# Patient Record
Sex: MALE | Race: WHITE | Employment: OTHER | URBAN - METROPOLITAN AREA
[De-identification: names, ages, dates, MRNs, and addresses within clinical notes are randomized per-mention and may not be internally consistent; named-entity substitution may affect disease eponyms.]

---

## 2017-01-04 ENCOUNTER — HOSPITAL ENCOUNTER (OUTPATIENT)
Dept: PHYSICAL THERAPY | Age: 62
Discharge: HOME | End: 2017-01-04
Payer: COMMERCIAL

## 2017-01-04 PROCEDURE — 97140 MANUAL THERAPY 1/> REGIONS: CPT

## 2017-01-04 PROCEDURE — 97110 THERAPEUTIC EXERCISES: CPT

## 2017-01-04 PROCEDURE — 97032 APPL MODALITY 1+ESTIM EA 15: CPT

## 2017-01-04 NOTE — PROGRESS NOTES
PHYSICAL THERAPY - DAILY TREATMENT NOTE    Patient Name: Brock Perez        Date: 2017  : 1955   YES Patient  Verified  Visit #:     Insurance: Payor: [employer] [place] / Plan: [place] MEDICARE PART A & B / Product Type: Medicare /      In time: 1:30 Out time: 2:30   Total Treatment Time: 60     TREATMENT AREA = Neck pain [M54.2]  Right shoulder pain [M25.511]    SUBJECTIVE    Pain Level (on 0 to 10 scale):  6-7  / 10   Medication Changes/New allergies or changes in medical history, any new surgeries or procedures? NO    If yes, update Summary List   Subjective Functional Status/Changes:  []  No changes reported     \"It was sore for a while, but not bad. Saw Terrence Rausch yesterday. Jessa Romero going to give me another injection on Tuesday next week. I told him it seems like Dr. Andrei Pina wants to schedule surgery (TSA), so he wants to make sure my neck is ok before the surgery b/c he knows that'll mess it up. OBJECTIVE    25 min Therapeutic Exercise:  [x]  See flow sheets   Rationale:      increase ROM, increase strength/stability, facilitate proper motor control. 15 min Manual Therapy: DN per below   Rationale:      decrease pain, increase ROM, increase tissue extensibility, decrease trigger points and facilitate proper motor control     Modalities Rationale: Prophylaxis/Palliative   20 min [] Estim, type/location: Electrical DN: 2-4 [email or web address] gauge solid filament needle for semi-standardized protocol for C/S radiculopathy. Re-wind and flick every 5'.  12 needles in situ                                    [x]  att     []  unatt     []  w/US     []  w/ice    []  W/heat    []  before manual    [x]  after manual    min []  Mechanical Traction: type/lbs                   []  pro   []  sup   []  int   []  cont    []  before manual    []  after manual    min []  Ultrasound, settings/location:      min []  Iontophoresis w/ dexamethasone, location: []  take home patch       []  in clinic    min []  Ice     []  Heat    Position/location:     min []  Vasopneumatic Device, press/temp:    [] Skin assessment post-treatment (if applicable):    []  intact    []  redness- no adverse reaction     []redness  adverse reaction:      Throughout Rx min Patient Education:  YES   Reviewed HEP   []  Progressed/Changed HEP based on:   Display of proper form in clinic. Improvement in condition and complaints     Other Objective/Functional Measures:    Dry Needling Procedure Note    Procedure: An intramuscular manual therapy (dry needling) and a neuro-muscular re-education treatment was done to deactivate myofascial trigger points with a 30 gauge filament needle under aseptic technique. Indications:  [x] Myofascial pain and dysfunction [] Muscled spasms  [] Myalgia/myositis   [] Muscle cramps  [] Muscle imbalances  [] Temporomandibular Dysfunction  [] Other:    Chart reviewed for the following:  Gal MITCHELL (certified treating therapist), have reviewed the medical history, summary list and precautions/contraindications for [facility] III.   TIME OUT performed immediately prior to start of procedure:  Gal MITCHELL (certified treating therapist), have performed the following reviews on [facility] III prior to the start of the session:      [x] Verified patient identification by name and date of birth    [x] Agreement on all muscles being treated was verified   [x] Purpose of dry needling, side effects, possible complications, risks and benefits were explained to the patient   [x] Procedure site(s) verified  [x] Patient was positioned for comfort and draped for privacy  [x] Informed Consent was signed (initial visit) and verified verbally (subsequent visits)  [x] Patient was instructed on the need to report the use of blood thinners and/or immunosuppressant medications  [x] How to respond to possible adverse effects of treatment  [x] Self treatment of post needling soreness: ice, heat (moist heat, heat wraps) and stretching  [x] Opportunity was given to ask any questions, all questions were answered            Time: 1:55  Date of procedure: 1/4/2017  Treatment: The following muscles were treated today with intramuscular dry needling  [] Left [] Right Masster  [] Left [] Right Temporalis  [] Left [] Right Zygomaticus Major / Minor  [] Left [] Right Lateral Pterygoid  [] Left [] Right Medial Pterygoid  [] Left [] Right Digastric Post / Anterior Belly  [] Left [] Right Sternocleidomastoid  [] Left [] Right Scalene Anterior / Medial / Posterior  [] Left [] Right Extra Laryngeal Muscles  [] Left [] Right Upper Trapezius  [] Left [] Right Middle Trapezius  [] Left [] Right Lower Trapezius  [] Left [] Right Oblique Capitis Inferior  [x] Left [x] Right Splenius Capitis / Cervicis  [x] Left [x] Right Semispinalis: Capitis / Cervicis  [x] Left [x] Right Multifidi / Rotatores Cervicis / Thoracic  [x] Left [x] Right Longissimus Thoracis / Illiocostalis  [] Left [] Right Levator Scapulae  [] Left [] Right Supraspinatus / Infraspinatus  [] Left [] Right Teres Major / Minor  [] Left [] Right Rhomboids / Serratus posterior superior  [] Left [] Right Pectoralis Major / Minor  [] Left [] Right Serratus Anterior  [] Left [] Right Latissimus Dorsi  [] Left [] Right Subscapularis  [] Left [] Right Coracobrachialis  [] Left [] Right Biceps Brachii  [] Left [] Right Deltoid: Anterior / Medial / Posterior  [] Left [] Right Brachialis  [] Left [] Right Triceps  [] Left [] Right Brachioradialis  [] Left [] Right Extensor Carpi Radialis Brevis / Extensor Carpi Radialis Longus    [] Left [] Right  Extensor digitorum  [] Left [] Right Supinator / Pronator Teres  [] Left [] Right Flexor Carpi Radialis/ Flexor Carpi Ulnaris   [] Left [] Right  Flexor Digitorum Superficialis/ Flexor Digitorum Profundus  [] Left [] Right Flexor Pollicis Longus / Flexor Pollicis Brevis / Palmaris Longus  [] Left [] Right Abductor Pollicis Longus / Abductor Pollicis Brevis  [] Left [] Right Opponens Pollicis / Adductor Pollicis  [] Left [] Right Dorsal / Palmar Interossei / Lumbricalis  [] Left [] Right Abductor Digiti Minimi / Opponens Digiti Minimi    Patient's response to today's treatment:  [] Latent Twitch Response     [x] Muscle relaxation [x] Pain Relief  [] Post needling soreness    [x] without complications  [] Increased Range of Motion   [] Decreased headaches    [] Decreased Tinnitus  [] Other:     Performed by: Xavier Castillo \"BJ\" QUENTIN Vargas, Cert. MDT, CertFlorentino DN         Post Treatment Pain Level (on 0 to 10) scale:   0  / 10     ASSESSMENT    Assessment/Changes in Function:     Responding well in pain relief of symptoms      []  See Progress Note/Recertification   Patient will continue to benefit from skilled PT services to modify and progress therapeutic interventions, address functional mobility deficits, address ROM deficits, address strength deficits, analyze and address soft tissue restrictions, analyze and cue movement patterns, analyze and modify body mechanics/ergonomics and instruct in home and community integration  to attain remaining goals   Progress toward goals / Updated goals:    Progress towards DC due to probable sx     PLAN    [x]  Upgrade activities as tolerated YES Continue plan of care   []  Discharge due to :    []  Other:      Therapist: Xavier Castillo \"BJ\" Carlos Dennis DPT, Chana LOTTT, CertFlorentino  DN    Date: 1/4/2017 Time: 1:49 PM   Future Appointments  Date Time Provider Corinne Morgan   1/11/2017 1:30 PM Joy Jay PT [facility]   1/18/2017 1:30 PM Joy Jay PT [facility]   1/25/2017 1:30 PM Joy Jay PT [facility]   10/27/2017 9:15 AM [facility] SAIMA RIVAS4 NURSE [facility] RADHA ESQUIVEL   11/3/2017 9:00 AM Al Sotelo MD [address] [facility]

## 2017-01-11 ENCOUNTER — APPOINTMENT (OUTPATIENT)
Dept: PHYSICAL THERAPY | Age: 62
End: 2017-01-11
Payer: COMMERCIAL

## 2017-01-18 ENCOUNTER — HOSPITAL ENCOUNTER (OUTPATIENT)
Dept: PHYSICAL THERAPY | Age: 62
Discharge: HOME | End: 2017-01-18
Payer: COMMERCIAL

## 2017-01-18 PROCEDURE — 97032 APPL MODALITY 1+ESTIM EA 15: CPT

## 2017-01-18 PROCEDURE — 97140 MANUAL THERAPY 1/> REGIONS: CPT

## 2017-01-18 NOTE — PROGRESS NOTES
PHYSICAL THERAPY - DAILY TREATMENT NOTE    Patient Name: Prince Kidd        Date: 2017  : 1955   YES Patient  Verified  Visit #:   7     Insurance: Payor: [employer] / Plan: Abraham Kennedy / Product Type: [employer] /      In time: 1:30 Out time: 2:10   Total Treatment Time: 40     TREATMENT AREA = Neck pain [M54.2]  Right shoulder pain [M25.511]    SUBJECTIVE    Pain Level (on 0 to 10 scale):  6-7  / 10   Medication Changes/New allergies or changes in medical history, any new surgeries or procedures? NO    If yes, update Summary List   Subjective Functional Status/Changes:  []  No changes reported     \"Dr. Maricarmen Espitia said to focus more of the treatment on the neck. They gave me another shot and it didn't do anything. They've recommended me to see Dr. Jania Kay. \"          OBJECTIVE    23 min Manual Therapy: DN per below   Rationale:      decrease pain, increase ROM, increase tissue extensibility, decrease trigger points and facilitate proper motor control   Modalities Rationale: Prophylaxis/Palliative   17 min [] Estim, type/location: Electrical DN: 2-4 [email or web address] gauge solid filament needle for semi-standardized protocol for C/S pain and radiculopathy. Re-wind and flick every 5'.  18 needles total in situ                                    [x]  att     []  unatt     []  w/US     []  w/ice    []  W/heat    []  before manual    []  after manual    min []  Mechanical Traction: type/lbs                   []  pro   []  sup   []  int   []  cont    []  before manual    []  after manual    min []  Ultrasound, settings/location:      min []  Iontophoresis w/ dexamethasone, location:                                               []  take home patch       []  in clinic    min []  Ice     []  Heat    Position/location:     min []  Vasopneumatic Device, press/temp:    [] Skin assessment post-treatment (if applicable):    []  intact    []  redness- no adverse reaction     []redness  adverse reaction:      Throughout Rx min Patient Education:  YES   Reviewed HEP   []  Progressed/Changed HEP based on:   Display of proper form in clinic. Improvement in condition and complaints     Other Objective/Functional Measures:    Dry Needling Procedure Note    Procedure: An intramuscular manual therapy (dry needling) and a neuro-muscular re-education treatment was done to deactivate myofascial trigger points with a 30 gauge filament needle under aseptic technique. Indications:  [] Myofascial pain and dysfunction [] Muscled spasms  [] Myalgia/myositis   [] Muscle cramps  [] Muscle imbalances  [] Temporomandibular Dysfunction  [] Other:    Chart reviewed for the following:  Alo MITCHELL (certified treating therapist), have reviewed the medical history, summary list and precautions/contraindications for [facility].   TIME OUT performed immediately prior to start of procedure:  Alo MITCHELL (certified treating therapist), have performed the following reviews on [facility] prior to the start of the session:      [x] Verified patient identification by name and date of birth    [x] Agreement on all muscles being treated was verified   [x] Purpose of dry needling, side effects, possible complications, risks and benefits were explained to the patient   [x] Procedure site(s) verified  [x] Patient was positioned for comfort and draped for privacy  [x] Informed Consent was signed (initial visit) and verified verbally (subsequent visits)  [x] Patient was instructed on the need to report the use of blood thinners and/or immunosuppressant medications  [x] How to respond to possible adverse effects of treatment  [x] Self treatment of post needling soreness: ice, heat (moist heat, heat wraps) and stretching  [x] Opportunity was given to ask any questions, all questions were answered            Time: 1:30  Date of procedure: 1/18/2017  Treatment: The following muscles were treated today with intramuscular dry needling  [] Left [] Right Masster  [] Left [] Right Temporalis  [] Left [] Right Zygomaticus Major / Minor  [] Left [] Right Lateral Pterygoid  [] Left [] Right Medial Pterygoid  [] Left [] Right Digastric Post / Anterior Belly  [] Left [] Right Sternocleidomastoid  [] Left [] Right Scalene Anterior / Medial / Posterior  [] Left [] Right Extra Laryngeal Muscles  [] Left [] Right Upper Trapezius  [] Left [] Right Middle Trapezius  [] Left [] Right Lower Trapezius  [x] Left [x] Right Oblique Capitis Inferior  [x] Left [x] Right Splenius Capitis / Cervicis  [x] Left [x] Right Semispinalis: Capitis / Cervicis  [] Left [] Right Multifidi / Rotatores Cervicis / Thoracic  [] Left [] Right Longissimus Thoracis / Illiocostalis  [] Left [] Right Levator Scapulae  [] Left [] Right Supraspinatus / Infraspinatus  [] Left [] Right Teres Major / Minor  [] Left [] Right Rhomboids / Serratus posterior superior  [] Left [] Right Pectoralis Major / Minor  [] Left [] Right Serratus Anterior  [] Left [] Right Latissimus Dorsi  [] Left [] Right Subscapularis  [] Left [] Right Coracobrachialis  [] Left [] Right Biceps Brachii  [] Left [] Right Deltoid: Anterior / Medial / Posterior  [] Left [] Right Brachialis  [] Left [] Right Triceps  [] Left [] Right Brachioradialis  [] Left [] Right Extensor Carpi Radialis Brevis / Extensor Carpi Radialis Longus    [] Left [] Right  Extensor digitorum  [] Left [] Right Supinator / Pronator Teres  [] Left [] Right Flexor Carpi Radialis/ Flexor Carpi Ulnaris   [] Left [] Right  Flexor Digitorum Superficialis/ Flexor Digitorum Profundus  [] Left [] Right Flexor Pollicis Longus / Flexor Pollicis Brevis / Palmaris Longus  [] Left [] Right Abductor Pollicis Longus / Abductor Pollicis Brevis  [] Left [] Right Opponens Pollicis / Adductor Pollicis  [] Left [] Right Dorsal / Palmar Interossei / Lumbricalis  [] Left [] Right Abductor Digiti Minimi / Opponens Digiti Minimi    Patient's response to today's treatment:  [] Latent Twitch Response     [] Muscle relaxation [] Pain Relief  [] Post needling soreness    [] without complications  [] Increased Range of Motion   [] Decreased headaches    [] Decreased Tinnitus  [] Other:     Performed by: Jayson Smith DPT, Cert. JOAN, Chana DN         Post Treatment Pain Level (on 0 to 10) scale:   0  / 10     ASSESSMENT    Assessment/Changes in Function:     C/S component primary for RUE     []  See Progress Note/Recertification   Patient will continue to benefit from skilled PT services to instruct in home and community integration  to attain remaining goals   Progress toward goals / Updated goals:    Progressing to DC NV     PLAN    [x]  Upgrade activities as tolerated YES Continue plan of care   []  Discharge due to :    []  Other:      Therapist: Judith Torres \"BJ\" QUENTIN Vargas, CertFlroentino FRANCO, CertFlorentino  DN    Date: 1/18/2017 Time: 1:28 PM   Future Appointments  Date Time Provider Corinne Morgan   1/18/2017 1:30 PM Izzy Taylor PT [facility]   1/25/2017 1:30 PM Izzy Taylor PT [facility]   10/27/2017 9:15 AM [facility] POD4 NURSE [facility] RADHA ESQUIVEL   11/3/2017 9:00 AM Roland Comer MD [address] [facility]

## 2017-01-25 ENCOUNTER — APPOINTMENT (OUTPATIENT)
Dept: PHYSICAL THERAPY | Age: 62
End: 2017-01-25
Payer: COMMERCIAL

## 2017-02-06 NOTE — PROGRESS NOTES
[address]  [facility] PHYSICAL THERAPY  [address] [facility] [address], [address] - Phone: [phone]  Fax: [phone] SUMMARY  Patient Name: New Gunn : 1955   Treatment/Medical Diagnosis: Neck pain [M54.2]  Right shoulder pain [M25.511]   Referral Source: Brenda Lopez     Date of Initial Visit: 16 Attended Visits: 7 Missed Visits: 0     SUMMARY OF TREATMENT  Patient's POC has consisted of therex, therapeutic activities, manual therapy prn, modalities prn, pt. education, and a comprehensive HEP. Treatment strategies used to address functional mobility deficits, ROM deficits, strength deficits, analyze and address soft tissue restrictions, analyze and cue movement patterns, analyze and modify body mechanics/ergonomics, assess and modify postural abnormalities and instruct in home and community integration. CURRENT STATUS  Patient attended rx at a rate of 1x/week aside from missing 2 weeks due to scheduled injection. High co-pay prevented frequency >1x/week. Towards end of treatment, he received 3 dry needling sessions. Throughout the course of treatment he continued to demonstrate that his C/S appeared to be the primary cause for any RUE symptoms as he centralized and abolished with repeated C/S movements. Needling/electrical needling to that region also alleviated all UE sensations. Unfortunately, due to a high copay, patient was unable to continue with course of treatment. He reports intent for further f/u with Dr. Collins Both to determine if surgery is indicated for his C/S.    GOALS/MEASURE OF PROGRESS Goal Met? · Short Term Goals: :  1. Patient to be adherent to HEP to facilitate pain control with ADL's  2. Centralize RUE sx to level of C/S to demonstrate effectiveness of directional preference exercises and decrease risk of UE dysfunction  3.  Patient to increase C/S AROM RR to > 60 degrees to without paraesthesia to facilitate safety in scanning environment. · Long Term Goals:   1. Patient to be Independent with HEP to self-manage/prevent symptoms after DC. 2. Patient to demonstrate full cervical AROM in all planes without peripheralization to indicate return to function. 3. Patient to increase FS FOTO score to > 65 to indicate increased functional independence. MET    MET        MET        MET    MET      NOT MET       G-Codes: n/a  RECOMMENDATIONS  Other: Patient to undergo further differential dx and testing to determine of sx is appropriate. Patient given comprehensive HEP for self treatment and prophylaxis. If you have any questions/comments please contact us directly at [phone]. Thank you for allowing us to assist in the care of your patient. Therapist Signature: Dennis Sanabria \"OLI\" Dennie Mews, THEAT, Cert. MDT, Cert. DN, Cert. SMT Date: 1/18/17   Reporting Period: n/a     Certification Period n/a Time: 1:12 PM     NOTE TO PHYSICIAN:  PLEASE COMPLETE THE ORDERS BELOW AND FAX TO   [facility] Physical Therapy: [phone]  If you are unable to process this request in 24 hours please contact our office: [phone]    ___ I have read the above report and request that my patient continue as recommended.   ___ I have read the above report and request that my patient continue therapy with the following changes/special instructions:_________________________________________________________   ___ I have read the above report and request that my patient be discharged from therapy.      Physician Signature:        Date:     Time:

## 2017-02-09 ENCOUNTER — HOSPITAL ENCOUNTER (OUTPATIENT)
Dept: GENERAL RADIOLOGY | Age: 62
Discharge: HOME | End: 2017-02-09
Payer: COMMERCIAL

## 2017-02-09 DIAGNOSIS — Z01.818 PRE-OP TESTING: ICD-10-CM

## 2017-02-09 PROCEDURE — 71020 XR CHEST PA LAT: CPT

## 2017-03-31 ENCOUNTER — HOSPITAL ENCOUNTER (EMERGENCY)
Age: 62
Discharge: HOME | End: 2017-03-31
Attending: EMERGENCY MEDICINE | Admitting: EMERGENCY MEDICINE
Payer: MEDICARE

## 2017-03-31 ENCOUNTER — APPOINTMENT (OUTPATIENT)
Dept: GENERAL RADIOLOGY | Age: 62
End: 2017-03-31
Attending: EMERGENCY MEDICINE
Payer: MEDICARE

## 2017-03-31 VITALS
HEIGHT: 68 IN | DIASTOLIC BLOOD PRESSURE: 85 MMHG | TEMPERATURE: 97.5 F | RESPIRATION RATE: 18 BRPM | BODY MASS INDEX: 22.73 KG/M2 | HEART RATE: 55 BPM | OXYGEN SATURATION: 97 % | WEIGHT: 150 LBS | SYSTOLIC BLOOD PRESSURE: 127 MMHG

## 2017-03-31 DIAGNOSIS — M54.50 ACUTE LOW BACK PAIN WITHOUT SCIATICA, UNSPECIFIED BACK PAIN LATERALITY: ICD-10-CM
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
## 2017-03-31 DIAGNOSIS — M54.2 CERVICAL SPINE PAIN: ICD-10-CM

## 2017-03-31 PROCEDURE — 72040 X-RAY EXAM NECK SPINE 2-3 VW: CPT

## 2017-03-31 PROCEDURE — 99283 EMERGENCY DEPT VISIT LOW MDM: CPT

## 2017-03-31 PROCEDURE — 72100 X-RAY EXAM L-S SPINE 2/3 VWS: CPT

## 2017-03-31 PROCEDURE — 74011250637 HC RX REV CODE- 250/637: Performed by: EMERGENCY MEDICINE

## 2017-03-31 RX ORDER — OXYCODONE AND ACETAMINOPHEN 5; 325 MG/1; MG/1
1 TABLET ORAL
Status: COMPLETED | OUTPATIENT
Start: 2017-03-31 | End: 2017-03-31

## 2017-03-31 RX ORDER — OXYCODONE AND ACETAMINOPHEN 5; 325 MG/1; MG/1
1 TABLET ORAL
Qty: 10 TAB | Refills: 0 | Status: SHIPPED | OUTPATIENT
Start: 2017-03-31 | End: 2020-01-31

## 2017-03-31 RX ADMIN — OXYCODONE HYDROCHLORIDE AND ACETAMINOPHEN 1 TABLET: 5; 325 TABLET ORAL at 12:25
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
## 2017-10-19 ENCOUNTER — HOSPITAL ENCOUNTER (OUTPATIENT)
Dept: GENERAL RADIOLOGY | Age: 62
Discharge: HOME | End: 2017-10-19
Payer: COMMERCIAL

## 2017-10-19 DIAGNOSIS — Z01.818 PRE-OP TESTING: ICD-10-CM

## 2017-10-19 PROCEDURE — 71020 XR CHEST PA LAT: CPT

## 2017-11-14 ENCOUNTER — HOSPITAL ENCOUNTER (OUTPATIENT)
Dept: PHYSICAL THERAPY | Age: 62
Discharge: HOME | End: 2017-11-14
Payer: COMMERCIAL

## 2017-11-14 PROCEDURE — G8984 CARRY CURRENT STATUS: HCPCS

## 2017-11-14 PROCEDURE — 97162 PT EVAL MOD COMPLEX 30 MIN: CPT

## 2017-11-14 PROCEDURE — 97016 VASOPNEUMATIC DEVICE THERAPY: CPT

## 2017-11-14 PROCEDURE — 97110 THERAPEUTIC EXERCISES: CPT

## 2017-11-14 PROCEDURE — G8985 CARRY GOAL STATUS: HCPCS

## 2017-11-14 PROCEDURE — 97140 MANUAL THERAPY 1/> REGIONS: CPT

## 2017-11-14 NOTE — PROGRESS NOTES
[facility] PHYSICAL THERAPY AT [address]  Phone: [phone] Fax: [phone] / [address] [place] [address] PHYSICAL THERAPY SERVICES  Patient Name: Jennyfer Clemons III : 1955   Medical   Diagnosis: Acute postoperative pain of right shoulder [G89.18, M25.511] Treatment Diagnosis: SP R TSR    Onset Date: DOS 10/26/17     Referral Source: Almyra Castleman* Start of Care [facility]): 2017   Prior Hospitalization: See medical history Provider #: [id number]   Prior Level of Function: Functional I with ADLs, IADLs, rec soto and     Comorbidities: Hx L RCR, LS fusion CS HNP, arthritis    Medications: Verified on Patient Summary List   The Plan of Care and following information is based on the information from the initial evaluation.   ========================================================================  Assessment / key information:  Pt is a 60y.o. year old male who presents with sp R TSR due to wear and tear from active lifestyle; DOS 10/26/17. Patient is approx 2.5 weeks post op. Prior to surgery, patient had multiple cortisone shots. Pain is managed with pain medication as needed. Patient is compliant with sling use. PMH significant for LS fusion , CS HNP and L shoulder RCR/SLAP repair (2014/15). Patient is R hand dominant. Current deficits include: increased pain to 8/10 at worst, decreased AROM/ PROM per chart below, fair (4+/5) bicep/ tricep, wrist and  strength, fair posture with R shoulder hike/ UT compensation  Functional deficits include: Deficits associated with sling use and post op protocol, mod I with ADLs, Rec activities: walking the dogs, gardening and fishing . Home exercise program initiated on initial evaluation to address these deficits. Pt would benefit from PT to address these deficits for increased functional mobility and QOL.         AROM PROM    Left Right   Left Right   Flexion   87 with compensation  Flexion   111   Extension   NT Extension   NT   Scaption/ABD   84 Scaptin/ABD   110   ER @ 0 Degrees   NT ER @ 0 Degrees   26   IR/ADD   NT IR/ADD   NT      ========================================================================  Eval Complexity: History: MEDIUM  Complexity : 1-2 comorbidities / personal factors will impact the outcome/ POC Exam:HIGH Complexity : 4+ Standardized tests and measures addressing body structure, function, activity limitation and / or participation in recreation  Presentation: MEDIUM Complexity : Evolving with changing characteristics  Clinical Decision Making:MEDIUM Complexity : FOTO score of 26-74Overall Complexity:MEDIUM  Problem List: pain affecting function, decrease ROM, decrease strength, edema affecting function, impaired gait/ balance, decrease ADL/ functional abilitiies, decrease activity tolerance, decrease flexibility/ joint mobility and other FOTO 37/100   Treatment Plan may include any combination of the following: Therapeutic exercise, Therapeutic activities, Neuromuscular re-education, Physical agent/modality, Gait/balance training, Manual therapy, Aquatic therapy, Patient education, Self Care training, Functional mobility training and Home safety training  Patient / Family readiness to learn indicated by: asking questions, trying to perform skills and interest  Persons(s) to be included in education: patient (P)  Barriers to Learning/Limitations: None  Measures taken:    Patient Goal (s): \"to use my arm again\"   Patient self reported health status: good  Rehabilitation Potential: good   Short Term Goals: To be accomplished in  1  weeks:  1. Pt will be independent and compliant with HEP   Long Term Goals: To be accomplished in  8-12  treatments:  1. Patient will increase FOTO score to 71/100 for indications of increased functional mobility.     2.  Patient will demo PROM flexion to 145 deg for improved joint mobility   3. Patient will demo AROM ER at 0 deg ABD to 40 deg per protocol for progression of functional ROM with ADLs   4. Patient will demo 5/5 elbow and wrist strength for ease with bathing , dressing and feeding activities   Frequency / Duration:   Patient to be seen  2-3  times per week for 8-12  treatments:  Patient / Caregiver education and instruction: self care, activity modification and exercises  G-Codes (GP): Carry: [id number] Current  CL= 60-79%    Goal  CJ= 20-39%. The severity rating is based on the FOTO Score  Therapist Signature: Deon eLung, MICHEAL Date: 13/39/2281   Certification Period: na Time: 1224pm    ========================================================================  I certify that the above Physical Therapy Services are being furnished while the patient is under my care. I agree with the treatment plan and certify that this therapy is necessary. Physician Signature:        Date:       Time:   Please sign and return to [facility] at . [place] or you may fax the signed copy to [phone]. Thank you.

## 2017-11-14 NOTE — PROGRESS NOTES
PT SHOULDER EVAL AND TREATMENT      Patient Name: Bora Husain III  Date:2017  : 1955  [x]  Patient  Verified  Payor: Mylene Mulligan / Plan: Cristal Sanders / Product Type: [employer] /    In time:1055  Out time:1155  Total Treatment Time (min): 60  Visit #: 1 of 8-12    Treatment Area: Acute postoperative pain of right shoulder [G89.18, M25.511]    SUBJECTIVE  Pain Level (0-10 scale):  (C): 0 at rest   (B): 0   (W):  8  Any medication changes, allergies to medications, diagnosis change, or new procedure performed: see summary sheet for update  Subjective functional status/changes:  CHIEF COMPLAINT:  Patient presents sp R TSR due to wear and tear from active lifestyle; DOS 10/26/17. Patient is approx 2.5 weeks post op. Prior to surgery, patient had multiple cortisone shots. Pain is managed with pain medication as needed. Patient is compliant with sling use. PMH significant for LS fusion , CS HNP and L shoulder RCR/SLAP repair (2014/15). Patient is R hand dominant. DEFICITS:  Deficits associated with sling use and post op protocol, mod I with ADLs, Rec activities: walking the dogs, gardening and fishing   OBJECTIVE:   Posture: R shoulder hike     ROM:  Per chart                                            AROM                                                              PROM   Left Right  Left Right   Flexion  87 with compensation  Flexion  111   Extension  NT Extension  NT   Scaption/ABD  84 Scaptin/ABD  110   ER @ 0 Degrees  NT ER @ 0 Degrees  26   IR/ADD  NT IR/ADD  NT      Strength:                                                                         L (1-5) R (1-5) Pain   Elbow Flexion  4+ [] Yes   [] No   Elbow Extension  4+ [] Yes   [] No   Wrist flexors and ext 4+     Scapulohumoral Control / Rhythm:  Able to eccentrically lower with good control?  Left: [x] Yes   [] No     Right: [x] Yes   [] No    Palpation  [] Min  [x] Mod  [] Severe    Location: deltoid tenderness     Other Tests / Comments:     Patient Education/ Therapeutic Exercise : [x] Discussed POT including PT expectation, established HEP with pictures and instruction per FS  (minutes) : 10    Manual: 11 min : PROM flexion , scaption and ER     Modality (rationale): decrease post treatment soreness / inflammation   [x]  VASO 10 min mod compression, min temp    Pain Level (0-10 scale) post treatment: 0 at rest     ASSESSMENT  [x]  See Plan of Care    PLAN  [x]  Upgrade activities as tolerated  [x] Other:_POC   2-3 x 8-12    Angelica Rocha, PT 11/14/2017      Justification for Eval Code Complexity:  Patient History : arthritis, hx CS HNP   Examination see exam   Clinical Presentation: evolving sec to post op  Clinical Decision Making : FOTO : 37 /100

## 2017-11-17 ENCOUNTER — HOSPITAL ENCOUNTER (OUTPATIENT)
Dept: PHYSICAL THERAPY | Age: 62
Discharge: HOME | End: 2017-11-17
Payer: COMMERCIAL

## 2017-11-17 PROCEDURE — 97140 MANUAL THERAPY 1/> REGIONS: CPT

## 2017-11-17 PROCEDURE — 97016 VASOPNEUMATIC DEVICE THERAPY: CPT

## 2017-11-17 PROCEDURE — 97110 THERAPEUTIC EXERCISES: CPT

## 2017-11-17 NOTE — PROGRESS NOTES
PT DAILY TREATMENT NOTE     Patient Name: Dave Miles III  Date:2017  : 1955  [x]  Patient  Verified  Payor: Yin Dwyer / Plan: [place] / Product Type: Medicare /    In time: 10:31 am         Out time: 11:30 am  Total Treatment Time (min): 59  Total Timed Codes (min): 49  1:1 Treatment Time (min): 30  Visit #: 2 of     Treatment Area: Acute postoperative pain of right shoulder [G89.18, M25.511]    SUBJECTIVE  Pain Level (0-10 scale): 0 at rest  Any medication changes, allergies to medications, adverse drug reactions, diagnosis change, or new procedure performed?: [x] No    [] Yes (see summary sheet for update)  Subjective functional status/changes:   [] No changes reported  \"I am doing the exercises. \"    OBJECTIVE  Modality rationale: decrease edema, decrease inflammation and decrease pain to improve the patients ability to tolerate ADLs, sleep quality   Min Type Additional Details    [] Estim: []Att   []Unatt        []TENS instruct                  []IFC  []Premod   []NMES                     []Other:  []w/US   []w/ice   []w/heat  Position:  Location:    []  Traction: [] Cervical       []Lumbar                       [] Prone          []Supine                       []Intermittent   []Continuous Lbs:  [] before manual  [] after manual    []  Ultrasound: []Continuous   [] Pulsed                           []1MHz   []3MHz Location:  W/cm2:    []  Iontophoresis with dexamethasone         Location: [] Take home patch   [] In clinic    []  Ice     []  heat  []  Ice massage Position:  Location:   10 [x]  Vasopneumatic Device with gripper Pressure:       [] lo [x] med [] hi   Temperature: [x] lo [] med [] hi   [x] Skin assessment post-treatment:  [x]intact []redness- no adverse reaction       []redness  adverse reaction:       38 min Therapeutic Exercise:  [x] See flow sheet: initiated therex per IE (billed 1 unit sec independence)   Rationale: increase ROM, increase strength and improve coordination to improve the patients ability to tolerate ADLs    11 min Manual Therapy:  semi-reclined (R) shoulder PROM flex, scap, ER@ 0 deg ABD, IR @ 0 deg ABD; elbow PROM; wrist PROM   Rationale: decrease pain, increase ROM and increase tissue extensibility to improve patient's mobility for ADLs, dressing         X min Patient Education: [x] Review HEP     Other Objective/Functional Measures:     Pain Level (0-10 scale) post treatment: 0    ASSESSMENT/Changes in Function:   Patient currently 4 mos, 1 day post-op. Good tolerance to treatment; pt demo's some (I) with therex sec hx of PT for (L) shoulder. Discussed and reviewed sling use and post-op precautions. Mild biceps tightness sec sling use, but pt able to achieve full extension pain-free - notes \"good stretch. \"    Patient will continue to benefit from skilled PT services to modify and progress therapeutic interventions, address functional mobility deficits, address ROM deficits, address strength deficits, analyze and address soft tissue restrictions, analyze and cue movement patterns, analyze and modify body mechanics/ergonomics and assess and modify postural abnormalities to attain remaining goals. [x]  See Plan of Care  []  See progress note/recertification  []  See Discharge Summary         Progress towards goals / Updated goals:  Short Term Goals: To be accomplished in  1  weeks:  1. Pt will be independent and compliant with HEP. -Goal met; pt notes compliance (11/17/17)  Long Term Goals: To be accomplished in  8-12  treatments:  1. Patient will increase FOTO score to 71/100 for indications of increased functional mobility. 2.  Patient will demo PROM flexion to 145 deg for improved joint mobility   3. Patient will demo AROM ER at 0 deg ABD to 40 deg per protocol for progression of functional ROM with ADLs   4.   Patient will demo 5/5 elbow and wrist strength for ease with bathing , dressing and feeding activities     PLAN  [x]  Upgrade activities as tolerated     [x]  Continue plan of care  []  Update interventions per flow sheet       []  Discharge due to:_  []  Other:_      Lucrecia Batista, KARY 11/17/2017

## 2017-11-21 ENCOUNTER — HOSPITAL ENCOUNTER (OUTPATIENT)
Dept: PHYSICAL THERAPY | Age: 62
Discharge: HOME | End: 2017-11-21
Payer: COMMERCIAL

## 2017-11-21 PROCEDURE — 97110 THERAPEUTIC EXERCISES: CPT | Performed by: PHYSICAL THERAPIST

## 2017-11-21 PROCEDURE — 97016 VASOPNEUMATIC DEVICE THERAPY: CPT | Performed by: PHYSICAL THERAPIST

## 2017-11-21 PROCEDURE — 97140 MANUAL THERAPY 1/> REGIONS: CPT | Performed by: PHYSICAL THERAPIST

## 2017-11-21 NOTE — PROGRESS NOTES
PT DAILY TREATMENT NOTE     Patient Name: Vinnie Dukes  Date:2017  : 1955  [x]  Patient  Verified  Payor: Shanon Skelton / Plan: [place] / Product Type: Medicare /    In time: 1130 am         Out time: 9441 am  Total Treatment Time (min): 54  Total Timed Codes (min): 54  1:1 Treatment Time (min):   Visit #: 3 of     Treatment Area: Acute postoperative pain of right shoulder [G89.18, M25.511]    SUBJECTIVE  Pain Level (0-10 scale): 0 at rest  Any medication changes, allergies to medications, adverse drug reactions, diagnosis change, or new procedure performed?: [x] No    [] Yes (see summary sheet for update)  Subjective functional status/changes:   [] No changes reported  \" I am still having trouble  Sleeping.  I sleep in the recliner or on the couch with an ice pack    OBJECTIVE  Modality rationale: decrease edema, decrease inflammation and decrease pain to improve the patients ability to tolerate ADLs, sleep quality   Min Type Additional Details    [] Estim: []Att   []Unatt        []TENS instruct                  []IFC  []Premod   []NMES                     []Other:  []w/US   []w/ice   []w/heat  Position:  Location:    []  Traction: [] Cervical       []Lumbar                       [] Prone          []Supine                       []Intermittent   []Continuous Lbs:  [] before manual  [] after manual    []  Ultrasound: []Continuous   [] Pulsed                           []1MHz   []3MHz Location:  W/cm2:    []  Iontophoresis with dexamethasone         Location: [] Take home patch   [] In clinic    []  Ice     []  heat  []  Ice massage Position:  Location:   10 [x]  Vasopneumatic Device with gripper Pressure:       [] lo [x] med [] hi   Temperature: [x] lo [] med [] hi   [x] Skin assessment post-treatment:  [x]intact []redness- no adverse reaction       []redness  adverse reaction:       34 min Therapeutic Exercise:  [x] See flow sheet: per flow sheet    Rationale: increase ROM, increase strength and improve coordination to improve the patients ability to tolerate ADLs    10 min Manual Therapy:  semi-reclined (R) shoulder PROM flex, scap, ER@ 0 deg ABD, IR @ 0 deg ABD; elbow PROM; wrist PROM   Rationale: decrease pain, increase ROM and increase tissue extensibility to improve patient's mobility for ADLs, dressing         X min Patient Education: [x] Review HEP     Other Objective/Functional Measures:  42deg ER , 75deg of ABD per protocol, 120 flexion per protocol    Pain Level (0-10 scale) post treatment: 0    ASSESSMENT/Changes in Function:   Patient currently 4 weeks, 4 days post-op. Instructed in HEP of biceps stretching and scap retractions. Tolerates PROM well with min guarding in end range Flexion. Patient will continue to benefit from skilled PT services to modify and progress therapeutic interventions, address functional mobility deficits, address ROM deficits, address strength deficits, analyze and address soft tissue restrictions, analyze and cue movement patterns, analyze and modify body mechanics/ergonomics and assess and modify postural abnormalities to attain remaining goals. [x]  See Plan of Care  []  See progress note/recertification  []  See Discharge Summary         Progress towards goals / Updated goals:  Short Term Goals: To be accomplished in  1  weeks:  1. Pt will be independent and compliant with HEP. -Goal met; pt notes compliance (11/17/17)  Long Term Goals: To be accomplished in  8-12  treatments:  1. Patient will increase FOTO score to 71/100 for indications of increased functional mobility. 2.  Patient will demo PROM flexion to 145 deg for improved joint mobility progressing 120deg 11-21-17  3. Patient will demo AROM ER at 0 deg ABD to 40 deg per protocol for progression of functional ROM with ADLs   4.   Patient will demo 5/5 elbow and wrist strength for ease with bathing , dressing and feeding activities     PLAN  [x]  Upgrade activities as tolerated     [x]  Continue plan of care  []  Update interventions per flow sheet       []  Discharge due to:_  []  Other:_      Chapo Romero, PT 11/21/2017

## 2017-11-27 ENCOUNTER — HOSPITAL ENCOUNTER (OUTPATIENT)
Dept: PHYSICAL THERAPY | Age: 62
Discharge: HOME | End: 2017-11-27
Payer: COMMERCIAL

## 2017-11-27 PROCEDURE — 97110 THERAPEUTIC EXERCISES: CPT

## 2017-11-27 PROCEDURE — 97016 VASOPNEUMATIC DEVICE THERAPY: CPT

## 2017-11-27 PROCEDURE — 97140 MANUAL THERAPY 1/> REGIONS: CPT

## 2017-11-27 NOTE — PROGRESS NOTES
PT DAILY TREATMENT NOTE     Patient Name: Janice Navarrete III  Date:2017  : 1955  [x]  Patient  Verified  Payor: [facility] MEDICARE / Plan: [place] MED[place]ARE PART A [employer] B / Product Type: Medicare /    In time: 18    Out time:106  Total Treatment Time (min): 65  Visit #: 4 of     Treatment Area: Acute postoperative pain of right shoulder [G89.18, M25.511]    SUBJECTIVE  Pain Level (0-10 scale): 0  Any medication changes, allergies to medications, adverse drug reactions, diagnosis change, or new procedure performed?: [x] No    [] Yes (see summary sheet for update)  Subjective functional status/changes:   [x] No changes reported  \"Doing pretty good\"    OBJECTIVE  Modality rationale: decrease edema, decrease inflammation and decrease pain to improve the patients ability to tolerate ADLs, sleep quality   Min Type Additional Details    [] Estim: []Att   []Unatt        []TENS instruct                  []IFC  []Premod   []NMES                     []Other:  []w/US   []w/ice   []w/heat  Position:  Location:    []  Traction: [] Cervical       []Lumbar                       [] Prone          []Supine                       []Intermittent   []Continuous Lbs:  [] before manual  [] after manual    []  Ultrasound: []Continuous   [] Pulsed                           []1MHz   []3MHz Location:  W/cm2:    []  Iontophoresis with dexamethasone         Location: [] Take home patch   [] In clinic    []  Ice     []  heat  []  Ice massage Position:  Location:   10 [x]  Vasopneumatic Device with gripper Pressure:       [] lo [x] med [] hi   Temperature: [x] lo [] med [] hi   [x] Skin assessment post-treatment:  [x]intact []redness- no adverse reaction       []redness  adverse reaction:       35 min Therapeutic Exercise:  [x] See flow sheet: per flow sheet    Rationale: increase ROM, increase strength and improve coordination to improve the patients ability to tolerate ADLs    20 min Manual Therapy:  Gentle distraction/ inferior glide grade 1-2, PROM ER, flexion and scaption    Rationale: decrease pain, increase ROM and increase tissue extensibility to improve patient's mobility for ADLs, dressing         X min Patient Education: [x] Review HEP     Other Objective/Functional Measures:     LTG 3 - AROM ER 42   Added supine cane therex to progress AAROM and functional reach     Pain Level (0-10 scale) post treatment: 0    ASSESSMENT/Changes in Function:   Patient progressing well. Tolerated new supine therex well without co pain. VCing for form for AA using L to assist R. Fatigue noted with cane scaption. AROM ER improved form IE. Patient encouraged to cont current HEP and HEP will be progressed in next few visits. Patient will continue to benefit from skilled PT services to modify and progress therapeutic interventions, address functional mobility deficits, address ROM deficits, address strength deficits, analyze and address soft tissue restrictions, analyze and cue movement patterns, analyze and modify body mechanics/ergonomics and assess and modify postural abnormalities to attain remaining goals. [x]  See Plan of Care  []  See progress note/recertification  []  See Discharge Summary         Progress towards goals / Updated goals:  Short Term Goals: To be accomplished in  1  weeks:  1. Pt will be independent and compliant with HEP. -Goal met; pt notes compliance (11/17/17)  Long Term Goals: To be accomplished in  8-12  treatments:  1. Patient will increase FOTO score to 71/100 for indications of increased functional mobility. 2.  Patient will demo PROM flexion to 145 deg for improved joint mobility progressing 120deg 11-21-17  3. Patient will demo AROM ER at 0 deg ABD to 40 deg per protocol for progression of functional ROM with ADLs goal met at 42deg  11/27/17  4.   Patient will demo 5/5 elbow and wrist strength for ease with bathing , dressing and feeding activities     PLAN  [x]  Upgrade activities as tolerated     [x] Continue plan of care  []  Update interventions per flow sheet       []  Discharge due to:_  [x]  Other:_ cont per protocol    Progress MARE Stahl, MICHEAL 11/27/2017

## 2017-11-29 NOTE — PROGRESS NOTES
PT DAILY TREATMENT NOTE     Patient Name: Lizette Paul  Date:2017  : 1955  [x]  Patient  Verified  Payor: [facility] MEDICARE / Plan: [place] MEDICARE PART A & B / Product Type: Medicare /    In time: 307 Out time:409  Total Treatment Time (min): 62  Visit #: 5 of     Treatment Area: Acute postoperative pain of right shoulder [G89.18, M25.511]    SUBJECTIVE  Pain Level (0-10 scale): 0  Any medication changes, allergies to medications, adverse drug reactions, diagnosis change, or new procedure performed?: [x] No    [] Yes (see summary sheet for update)  Subjective functional status/changes:   [x] No changes reported  \"Its been a strange good day. \"    OBJECTIVE  Modality rationale: decrease edema, decrease inflammation and decrease pain to improve the patients ability to tolerate ADLs, sleep quality   Min Type Additional Details    [] Estim: []Att   []Unatt        []TENS instruct                  []IFC  []Premod   []NMES                     []Other:  []w/US   []w/ice   []w/heat  Position:  Location:    []  Traction: [] Cervical       []Lumbar                       [] Prone          []Supine                       []Intermittent   []Continuous Lbs:  [] before manual  [] after manual    []  Ultrasound: []Continuous   [] Pulsed                           []1MHz   []3MHz Location:  W/cm2:    []  Iontophoresis with dexamethasone         Location: [] Take home patch   [] In clinic    []  Ice     []  heat  []  Ice massage Position:  Location:   10 [x]  Vasopneumatic Device with gripper Pressure:       [] lo [x] med [] hi   Temperature: [x] lo [] med [] hi   [x] Skin assessment post-treatment:  [x]intact []redness- no adverse reaction       []redness  adverse reaction:       32 min Therapeutic Exercise:  [x] See flow sheet: per flow sheet    Rationale: increase ROM, increase strength and improve coordination to improve the patients ability to tolerate ADLs    20 min Manual Therapy:  Gentle distraction/ inferior glide grade 1-2, PROM ER, flexion and scaption    Rationale: decrease pain, increase ROM and increase tissue extensibility to improve patient's mobility for ADLs, dressing         X min Patient Education: [x] Review HEP - added supine cane exercises      Other Objective/Functional Measures:     LTG 1 - improvements in function : feeding , writing    AAROM on pulleys scaption 125    Pain Level (0-10 scale) post treatment: 0    ASSESSMENT/Changes in Function:   Patient demo improved AAROM on pulleys and improved joint mobility with end range joint stiffness - WNL for post op TSR. Added SL ER for AROM - patient tolerated well without adverse reaction     Patient will continue to benefit from skilled PT services to modify and progress therapeutic interventions, address functional mobility deficits, address ROM deficits, address strength deficits, analyze and address soft tissue restrictions, analyze and cue movement patterns, analyze and modify body mechanics/ergonomics and assess and modify postural abnormalities to attain remaining goals. [x]  See Plan of Care  []  See progress note/recertification  []  See Discharge Summary         Progress towards goals / Updated goals:  Short Term Goals: To be accomplished in  1  weeks:  1. Pt will be independent and compliant with HEP. -Goal met; pt notes compliance (11/17/17)  Long Term Goals: To be accomplished in  8-12  treatments:  1. Patient will increase FOTO score to 71/100 for indications of increased functional mobility.  goal progressing as indicated by improved functional mobility 11/30/17  2. Patient will demo PROM flexion to 145 deg for improved joint mobility progressing 120deg 11-21-17  3. Patient will demo AROM ER at 0 deg ABD to 40 deg per protocol for progression of functional ROM with ADLs goal met at 42deg  11/27/17  4.   Patient will demo 5/5 elbow and wrist strength for ease with bathing , dressing and feeding activities     PLAN  [x] Upgrade activities as tolerated     [x]  Continue plan of care  []  Update interventions per flow sheet       []  Discharge due to:_  [x]  Other:_cont as tolerated       Suha Toney, PT 11/30/17

## 2017-11-30 ENCOUNTER — HOSPITAL ENCOUNTER (OUTPATIENT)
Dept: PHYSICAL THERAPY | Age: 62
Discharge: HOME | End: 2017-11-30
Payer: COMMERCIAL

## 2017-11-30 PROCEDURE — 97016 VASOPNEUMATIC DEVICE THERAPY: CPT

## 2017-11-30 PROCEDURE — 97140 MANUAL THERAPY 1/> REGIONS: CPT

## 2017-11-30 PROCEDURE — 97110 THERAPEUTIC EXERCISES: CPT

## 2017-12-04 ENCOUNTER — HOSPITAL ENCOUNTER (OUTPATIENT)
Dept: PHYSICAL THERAPY | Age: 62
Discharge: HOME | End: 2017-12-04
Payer: COMMERCIAL

## 2017-12-04 PROCEDURE — 97110 THERAPEUTIC EXERCISES: CPT

## 2017-12-04 PROCEDURE — 97140 MANUAL THERAPY 1/> REGIONS: CPT

## 2017-12-04 PROCEDURE — G8985 CARRY GOAL STATUS: HCPCS

## 2017-12-04 PROCEDURE — 97016 VASOPNEUMATIC DEVICE THERAPY: CPT

## 2017-12-04 PROCEDURE — G8984 CARRY CURRENT STATUS: HCPCS

## 2017-12-04 NOTE — PROGRESS NOTES
PT DAILY TREATMENT NOTE     Patient Name: Jennifer Carrasco  Date:2017  : 1955  [x]  Patient  Verified  Payor: [facility] MEDICARE / Plan: [place] MEDICARE PART A & B / Product Type: Medicare /    In time:11:04  Out time:12:00  Total Treatment Time (min): 56  Total Timed Codes (min): 46  1:1 Treatment Time (min): 46   Visit #: 6 of     Treatment Area: Acute postoperative pain of right shoulder [G89.18, M25.511]    SUBJECTIVE  Pain Level (0-10 scale): 2-3  Any medication changes, allergies to medications, adverse drug reactions, diagnosis change, or new procedure performed?: [x] No    [] Yes (see summary sheet for update)  Subjective functional status/changes:   [] No changes reported  Took a little spill the other day so I reduced my HEP accordingly and it was much better> I can get by with 1/2 a pain pill after doing my HEP  Pain ranges 0-8/10, with ER ROM. Ave 3-4/10.   30-35% improvement. Improvements:  Brushing teeth, eating, getting pants on, toileting, walking dogs,   Deficits: sleeping comfortably, waking 2-3x/night.         OBJECTIVE  Modality rationale: decrease edema, decrease inflammation and decrease pain to improve the patients ability to improve self-care    Min Type Additional Details    [] Estim: []Att   []Unatt        []TENS instruct                  []IFC  []Premod   []NMES                     []Other:  []w/US   []w/ice   []w/heat  Position:  Location:    []  Traction: [] Cervical       []Lumbar                       [] Prone          []Supine                       []Intermittent   []Continuous Lbs:  [] before manual  [] after manual    []  Ultrasound: []Continuous   [] Pulsed                           []1MHz   []3MHz Location:  W/cm2:    []  Iontophoresis with dexamethasone         Location: [] Take home patch   [] In clinic    []  Ice     []  heat  []  Ice massage Position:  Location:   10 [x]  Vasopneumatic Device Pressure:       [] lo [x] med [] hi   Temperature: [x] lo [] med [] hi   [x] Skin assessment post-treatment:  [x]intact []redness- no adverse reaction       []redness  adverse reaction:       31 min Therapeutic Exercise:  [x] See flow sheet :   Rationale: increase ROM, increase strength and improve coordination to improve the patients ability to improve progress for return to PLOF     15 min Manual Therapy:  Reassessment, gentle grade I to II joint mobs; PROM flexion, Er at 0 and scaption/abduction in Protocol ROM    Rationale: decrease pain, increase ROM and increase tissue extensibility to improve reaching, self-care, mobility           x min Patient Education: [x] Review HEP    [] Progressed/Changed HEP based on:     Weaning out of sling  [] positioning   [] body mechanics   [] transfers   [] heat/ice application        Other Objective/Functional Measures:                      AROM                                                              PROM     Left Right    Left Right   Flexion    120 with compensation  Flexion    120   Extension    NI Extension    NI   Scaption/ABD    84 Scaptin/ABD    110   ER @ 0 Degrees    40 ER @ 0 Degrees    40   IR/ADD    NI IR/ADD    NT      MMT , wrist, tricep (=) to L, bicep and tricep 4/5,   Shoulder flexion, abd and ER 4-/5 in available range  FOTO 50/100    Pain Level (0-10 scale) post treatment: 0    ASSESSMENT/Changes in Function: see PN    Patient will continue to benefit from skilled PT services to modify and progress therapeutic interventions, address functional mobility deficits, address ROM deficits, address strength deficits, analyze and address soft tissue restrictions, analyze and cue movement patterns, analyze and modify body mechanics/ergonomics and assess and modify postural abnormalities to attain remaining goals.      []  See Plan of Care  []  See progress note/recertification  []  See Discharge Summary         Progress towards goals / Updated goals:  Short Term Goals: To be accomplished in  1  weeks:  1.  Pt will be independent and compliant with HEP. -Goal met; pt notes compliance (11/17/17)  Long Term Goals: To be accomplished in  8-12  treatments:  1.  Patient will increase FOTO score to 71/100 for indications of increased functional mobility.  goal progressing at 50/100 (12/4/17)  2.  Patient will demo PROM flexion to 145 deg for improved joint mobility progressing 120deg per protocol limitations 12/4/17)  3. Patient will demo AROM ER at 0 deg ABD to 40 deg per protocol for progression of functional ROM with ADLs goal met at 42deg  12/4/17  4. Patient will demo 5/5 elbow and wrist strength for ease with bathing , dressing and feeding activities  Progressing with 5/5 , 4/5 bicep and tricep (12/4/17)     PLAN  [x]  Upgrade activities as tolerated     []  Continue plan of care  []  Update interventions per flow sheet       []  Discharge due to:_  [x]  Other: Pt to see MD on Wednesday.  Anticipate out of sling and progression to 6 week post-op protocol. Gianni Scales, PT 12/4/2017  11:04 AM

## 2017-12-04 NOTE — PROGRESS NOTES
[facility] [address] [facility] AT [address] Samia Simon  Phone: [phone] Fax: [phone]  PROGRESS NOTE  Patient Name: Christian Jackson : 1955   Treatment/Medical Diagnosis: Acute postoperative pain of right shoulder [G89.18, M25.511]   Referral Source: Lani Robbins*     Date of Initial Visit: 17 Attended Visits: 6 Missed Visits: 0     SUMMARY OF TREATMENT   Pt is a 60y.o. year old male who presents with sp R TSR due to wear and tear from active lifestyle; DOS 10/26/17. Ther ex including strengthening, ROM, flexibility, stabilization; manual therapy including: joint mobs for pain control, PROM; Patient education; HEP, postural education, vaso for pain and edema   CURRENT STATUS  Pt is making good progress in therapy. Pain ranges from 0-8/10, ave 3-4/10 with ADLs, and pt rates 30-35% improvement. Score on the FOTO has improved from 37/100 at IE to 50/100. Pt is compliant with HEP and sling use. Uses 1/2 tablet pain med after HEP daily. Improvements:  Brushing teeth, eating, getting pants on, toileting, walking dogs,   Deficits: sleeping comfortably, waking 2-3x/night; PROTOCOL limitations       AROM                       PROM              MMT     Right Right    Flexion 120 with compensation  120 4/5   Extension NI NI NI   Scaption/ABD 84 110 4-/5   ER @ 0 Degrees 40 40 4/5   IR/ADD NI NT  NI    MMT , wrist, tricep (=) to L, bicep and tricep 4/5,     Short Term Goals: To be accomplished in  1  weeks:  1.  Pt will be independent and compliant with HEP. -Goal met; pt notes compliance (17)  Long Term Goals: To be accomplished in  8-12  treatments:  1.  Patient will increase FOTO score to 71/100 for indications of increased functional mobility.  goal progressing at 50/100 (17)  2.  Patient will demo PROM flexion to 145 deg for improved joint mobility progressing 120deg per protocol limitations 17)  3.  Patient will demo AROM ER at 0 deg ABD to 40 deg per protocol for progression of functional ROM with ADLs goal met at 42deg  12/4/17  4.  Patient will demo 5/5 elbow and wrist strength for ease with bathing , dressing and feeding activities  Progressing with 5/5 , 4/5 bicep and tricep (12/4/17)     New Goals to be achieved in __8-12__  treatments:  1.   Patient will demo PROM flexion to 145 deg for improved joint mobility    2. Pt will have AROM ER at 0 degrees abd to > or = 75 deg to progress dressing, functional ADLs   3. Patient will demo 5/5 elbow and wrist strength for ease with bathing , dressing and feeding activities   4. Pt will have AROM R GHJ elevation to > or = 150 with normal mechanics to improve ADLs, dressing, self-care     G-Codes: na  RECOMMENDATIONS  Pt to be see in your office on Wednesday. Please advise regarding progression to 6 week post-op phase of Protocol. Thank you     If you have any questions/comments please contact us directly at [phone]. Thank you for allowing us to assist in the care of your patient. Therapist Signature: Arthur Dunn DPT Date: 12/4/2017     Time: 11:30 AM   NOTE TO PHYSICIAN:  PLEASE COMPLETE THE ORDERS BELOW AND FAX TO   [facility]: [phone]. If you are unable to process this request in 24 hours please contact our office: [phone].  ___ I have read the above report and request that my patient continue as recommended.   ___ I have read the above report and request that my patient continue therapy with the following changes/special instructions:_________________________________________________________   ___ I have read the above report and request that my patient be discharged from therapy.      Physician Signature:        Date:       Time:

## 2017-12-07 ENCOUNTER — HOSPITAL ENCOUNTER (OUTPATIENT)
Dept: PHYSICAL THERAPY | Age: 62
Discharge: HOME | End: 2017-12-07
Payer: COMMERCIAL

## 2017-12-07 PROCEDURE — 97110 THERAPEUTIC EXERCISES: CPT

## 2017-12-07 PROCEDURE — 97014 ELECTRIC STIMULATION THERAPY: CPT

## 2017-12-07 PROCEDURE — 97016 VASOPNEUMATIC DEVICE THERAPY: CPT

## 2017-12-07 PROCEDURE — 97140 MANUAL THERAPY 1/> REGIONS: CPT

## 2017-12-07 NOTE — PROGRESS NOTES
PT DAILY TREATMENT NOTE     Patient Name: Analia Adjutant  Date:2017  : 1955  [x]  Patient  Verified  Payor: Dixon Saez / Plan: [place] / Product Type: Medicare /    In time: 9:30 am      Out time: 10:30 am  Total Treatment Time (min): 60  Total Timed Codes (min): 50  1:1 Treatment Time (min): 39   Visit #: 1 of     Treatment Area: Acute postoperative pain of right shoulder [G89.18, M25.511]    SUBJECTIVE  Pain Level (0-10 scale): 2  Any medication changes, allergies to medications, adverse drug reactions, diagnosis change, or new procedure performed?: [x] No    [] Yes (see summary sheet for update)  Subjective functional status/changes:   [] No changes reported  \"I saw my doctor the other day and he told me I am ready for the next phase. \" Patient presents without sling, notes MD recommended wean away from sling as indicated by protocol.     OBJECTIVE  Modality rationale: decrease edema, decrease inflammation and decrease pain to improve the patients ability to improve self-care    Min Type Additional Details    [] Estim: []Att   []Unatt        []TENS instruct                  []IFC  []Premod   []NMES                     []Other:  []w/US   []w/ice   []w/heat  Position:  Location:    []  Traction: [] Cervical       []Lumbar                       [] Prone          []Supine                       []Intermittent   []Continuous Lbs:  [] before manual  [] after manual    []  Ultrasound: []Continuous   [] Pulsed                           []1MHz   []3MHz Location:  W/cm2:    []  Iontophoresis with dexamethasone         Location: [] Take home patch   [] In clinic    []  Ice     []  heat  []  Ice massage Position:  Location:   10 [x]  Vasopneumatic Device Pressure:       [] lo [x] med [] hi   Temperature: [x] lo [] med [] hi   [x] Skin assessment post-treatment:  [x]intact []redness- no adverse reaction       []redness  adverse reaction:     36 min Therapeutic Exercise:  [x] See flow sheet: added YTB shoulder flexion to 80 deg, abd to 80 deg in neutral rotation, ER to neutral 0 deg; cane extension AAROM   Rationale: increase ROM, increase strength and improve coordination to improve the patients ability to improve progress for return to PLOF     14 min Manual Therapy:  gentle grade I to II joint mobs; PROM flexion, ER at 0 deg ABD and 45 deg ABD, scaption/abduction in protocol ROM    Rationale: decrease pain, increase ROM and increase tissue extensibility to improve reaching, self-care, mobility           X min Patient Education: [x] Review HEP; sling use as needed indicated by soreness with gradual weaning; added YTB shoulder flexion, abduction, ER to neutral, cane extension (cognizant of avoiding shoulder hiking and therex within pain-free limits)     Other Objective/Functional Measures:   PROM: flex 120 deg, abd to ~ 88 deg in neutral rotation, ER to 45 deg    Pain Level (0-10 scale) post treatment: 0    ASSESSMENT/Changes in Function:   Patient currently 6 weeks post-op and able to progress to next phase of strengthening per protocol and MD recommendation (per pt report). Good tolerance today to strengthening into flexion, abduction, and ER; AAROM extension. Patient will continue to benefit from skilled PT services to modify and progress therapeutic interventions, address functional mobility deficits, address ROM deficits, address strength deficits, analyze and address soft tissue restrictions, analyze and cue movement patterns, analyze and modify body mechanics/ergonomics and assess and modify postural abnormalities to attain remaining goals. []  See Plan of Care  [x]  See progress note/recertification (89/6/91)  []  See Discharge Summary         Progress towards goals / Updated goals:  1.   Patient will demo PROM flexion to 145 deg for improved joint mobility    2. Pt will have AROM ER at 0 degrees abd to > or = 75 deg to progress dressing, functional ADLs.  -Goal met; AROM ER at 0 deg with resisted ER, ABD to ~80 deg with normal SHR (12/7/17)   3. Patient will demo 5/5 elbow and wrist strength for ease with bathing , dressing and feeding activities   4.   Pt will have AROM R GHJ elevation to > or = 150 with normal mechanics to improve ADLs, dressing, self-care     PLAN  [x]  Upgrade activities as tolerated     [x]  Continue plan of care  []  Update interventions per flow sheet       []  Discharge due to:_  [x]  Other: assess response to treatment    Jeromy Cleaning, PTA  12/7/2017

## 2017-12-11 ENCOUNTER — HOSPITAL ENCOUNTER (OUTPATIENT)
Dept: PHYSICAL THERAPY | Age: 62
Discharge: HOME | End: 2017-12-11
Payer: COMMERCIAL

## 2017-12-11 PROCEDURE — 97110 THERAPEUTIC EXERCISES: CPT

## 2017-12-11 PROCEDURE — 97140 MANUAL THERAPY 1/> REGIONS: CPT

## 2017-12-11 PROCEDURE — 97016 VASOPNEUMATIC DEVICE THERAPY: CPT

## 2017-12-14 ENCOUNTER — HOSPITAL ENCOUNTER (OUTPATIENT)
Dept: PHYSICAL THERAPY | Age: 62
Discharge: HOME | End: 2017-12-14
Payer: COMMERCIAL

## 2017-12-14 PROCEDURE — 97140 MANUAL THERAPY 1/> REGIONS: CPT

## 2017-12-14 PROCEDURE — 97016 VASOPNEUMATIC DEVICE THERAPY: CPT

## 2017-12-14 PROCEDURE — 97110 THERAPEUTIC EXERCISES: CPT

## 2017-12-14 NOTE — PROGRESS NOTES
PT DAILY TREATMENT NOTE     Patient Name: Vishal Hou III  Date:2017  : 1955  [x]  Patient  Verified  Payor: Alisha Arroyo / Plan: [place] MED[place]ARE PART [place] & B / Product Type: Medicare /    In time: 9:31 am            Out time: 10:40 am  Total Treatment Time (min): 69  Total Timed Codes (min): 59  1:1 Treatment Time (min): 39  Visit #: 3 of     Treatment Area: Acute postoperative pain of right shoulder [G89.18, M25.511]    SUBJECTIVE  Pain Level (0-10 scale): 2  Any medication changes, allergies to medications, adverse drug reactions, diagnosis change, or new procedure performed?: [x] No    [] Yes (see summary sheet for update)  Subjective functional status/changes:   [] No changes reported  \"Just a little sore. \"    OBJECTIVE  Modality rationale: decrease edema, decrease inflammation and decrease pain to improve the patients ability to improve self-care    Min Type Additional Details    [] Estim: []Att   []Unatt        []TENS instruct                  []IFC  []Premod   []NMES                     []Other:  []w/US   []w/ice   []w/heat  Position:  Location:    []  Traction: [] Cervical       []Lumbar                       [] Prone          []Supine                       []Intermittent   []Continuous Lbs:  [] before manual  [] after manual    []  Ultrasound: []Continuous   [] Pulsed                           []1MHz   []3MHz Location:  W/cm2:    []  Iontophoresis with dexamethasone         Location: [] Take home patch   [] In clinic    []  Ice     []  heat  []  Ice massage Position:  Location:   10 [x]  Vasopneumatic Device Pressure:       [] lo [x] med [] hi   Temperature: [x] lo [] med [] hi   [x] Skin assessment post-treatment:  [x]intact []redness- no adverse reaction       []redness  adverse reaction:     44 min Therapeutic Exercise:  [x] See flow sheet:    Rationale: increase ROM, increase strength and improve coordination to improve the patients ability to improve progress for return to PLOF 15 min Manual Therapy:  Grade 2-3 distraction and posterior glide, pect major and lat / teres release, PROM flexion, scaption, ER and IR    Rationale: decrease pain, increase ROM and increase tissue extensibility to improve reaching, self-care, mobility           X min Patient Education: [x] Review HEP     Other Objective/Functional Measures:    Shoulder AROM: flex 120 (AAROM 158 deg with cane in supine), abd 122 deg, ER 51 deg    Pain Level (0-10 scale) post treatment: 0    ASSESSMENT/Changes in Function:   Mobility steadily improving and pt notes good HEP compliance to progress towards goals. Patient will continue to benefit from skilled PT services to modify and progress therapeutic interventions, address functional mobility deficits, address ROM deficits, address strength deficits, analyze and address soft tissue restrictions, analyze and cue movement patterns, analyze and modify body mechanics/ergonomics and assess and modify postural abnormalities to attain remaining goals. []  See Plan of Care  [x]  See progress note/recertification (36/2/60)  []  See Discharge Summary         Progress towards goals / Updated goals:  1.   Patient will demo PROM flexion to 145 deg for improved joint mobility    2. Pt will have AROM ER at 0 degrees abd to > or = 75 deg to progress dressing, functional ADLs. -Goal met; AROM at 0 deg ABD to 51 deg ER AROM (12/14/17)   3. Patient will demo 5/5 elbow and wrist strength for ease with bathing , dressing and feeding activities   4. Pt will have AROM R GHJ elevation to > or = 150 with normal mechanics to improve ADLs, dressing, self-care.  -Goal progressing; AAROM flexion/scaption to 158 deg (12/14/17)     PLAN  [x]  Upgrade activities as tolerated     [x]  Continue plan of care  []  Update interventions per flow sheet       []  Discharge due to:_  []  Other:_    Kendell Parrish, PTA  12/14/2017

## 2017-12-18 ENCOUNTER — HOSPITAL ENCOUNTER (OUTPATIENT)
Dept: PHYSICAL THERAPY | Age: 62
Discharge: HOME | End: 2017-12-18
Payer: COMMERCIAL

## 2017-12-18 PROCEDURE — 97110 THERAPEUTIC EXERCISES: CPT

## 2017-12-18 PROCEDURE — 97140 MANUAL THERAPY 1/> REGIONS: CPT

## 2017-12-18 PROCEDURE — 97016 VASOPNEUMATIC DEVICE THERAPY: CPT

## 2017-12-18 NOTE — PROGRESS NOTES
PT DAILY TREATMENT NOTE     Patient Name: Vince Bowling III  Date:2017  : 1955  [x]  Patient  Verified  Payor: Cameron Drafts / Plan: [place] MED[place]ARE [place] & B / Product Type: Medicare /    In time:927    Out time: 2396  Total Treatment Time (min): 82  Total Timed Codes (min): 72  1:1 Treatment Time (min): 60  Visit #: 4 of     Treatment Area: Acute postoperative pain of right shoulder [G89.18, M25.511]    SUBJECTIVE  Pain Level (0-10 scale): 2  Any medication changes, allergies to medications, adverse drug reactions, diagnosis change, or new procedure performed?: [x] No    [] Yes (see summary sheet for update)  Subjective functional status/changes:   [] No changes reported  \"Doing pretty good. \"    OBJECTIVE  Modality rationale: decrease edema, decrease inflammation and decrease pain to improve the patients ability to improve self-care    Min Type Additional Details    [] Estim: []Att   []Unatt        []TENS instruct                  []IFC  []Premod   []NMES                     []Other:  []w/US   []w/ice   []w/heat  Position:  Location:    []  Traction: [] Cervical       []Lumbar                       [] Prone          []Supine                       []Intermittent   []Continuous Lbs:  [] before manual  [] after manual    []  Ultrasound: []Continuous   [] Pulsed                           []1MHz   []3MHz Location:  W/cm2:    []  Iontophoresis with dexamethasone         Location: [] Take home patch   [] In clinic    []  Ice     []  heat  []  Ice massage Position:  Location:   10 [x]  Vasopneumatic Device Pressure:       [] lo [x] med [] hi   Temperature: [x] lo [] med [] hi   [x] Skin assessment post-treatment:  [x]intact []redness- no adverse reaction       []redness  adverse reaction:     60 (2 units charged min Therapeutic Exercise:  [x] See flow sheet:    Rationale: increase ROM, increase strength and improve coordination to improve the patients ability to improve progress for return to PLOF 12 min Manual Therapy:  Grade 2-3 distraction and posterior glide, pect major and lat / teres release, PROM flexion, scaption, ER and IR    Rationale: decrease pain, increase ROM and increase tissue extensibility to improve reaching, self-care, mobility           X min Patient Education: [x] Review HEP     Other Objective/Functional Measures:    LTG 3- ELBOW strength flexion 5,ext 5   Deficits: pain at night   Improvements: daily use    Pain Level (0-10 scale) post treatment: 0    ASSESSMENT/Changes in Function: Patient tolerated program well with resumption of SL therex and bicep strengthening. Patient reports doing isometrics at home and using the UE for normal ADLs. AAROM with table slides progressing very well and PROM flexion to greater than 150 deg. Cont VASO for post treatment pain reduction     Patient will continue to benefit from skilled PT services to modify and progress therapeutic interventions, address functional mobility deficits, address ROM deficits, address strength deficits, analyze and address soft tissue restrictions, analyze and cue movement patterns, analyze and modify body mechanics/ergonomics and assess and modify postural abnormalities to attain remaining goals. []  See Plan of Care  [x]  See progress note/recertification (73/6/24)  []  See Discharge Summary         Progress towards goals / Updated goals:  1.   Patient will demo PROM flexion to 145 deg for improved joint mobility    2. Pt will have AROM ER at 0 degrees abd to > or = 75 deg to progress dressing, functional ADLs. -Goal met; AROM at 0 deg ABD to 51 deg ER AROM (12/14/17)   3. Patient will demo 5/5 elbow and wrist strength for ease with bathing , dressing and feeding activities goal met at 5/5  12/18/17   4. Pt will have AROM R GHJ elevation to > or = 150 with normal mechanics to improve ADLs, dressing, self-care.  -Goal progressing; AAROM flexion/scaption to 158 deg (12/14/17)     PLAN  [x]  Upgrade activities as tolerated     [x]  Continue plan of care  []  Update interventions per flow sheet       []  Discharge due to:_  [x]  Other:_ add TB IR, ER, flexion and scaption GEETHA Quinonez, PT  12/18/2017

## 2017-12-21 ENCOUNTER — HOSPITAL ENCOUNTER (OUTPATIENT)
Dept: PHYSICAL THERAPY | Age: 62
Discharge: HOME | End: 2017-12-21
Payer: COMMERCIAL

## 2017-12-21 PROCEDURE — 97016 VASOPNEUMATIC DEVICE THERAPY: CPT

## 2017-12-21 PROCEDURE — 97110 THERAPEUTIC EXERCISES: CPT

## 2017-12-21 PROCEDURE — 97140 MANUAL THERAPY 1/> REGIONS: CPT

## 2017-12-21 NOTE — PROGRESS NOTES
PT DAILY TREATMENT NOTE     Patient Name: Patricia Leung III  Date:2017  : 1955  [x]  Patient  Verified  Payor: Ricardo Solders / Plan: [place] MEDICARE PART [place] & B / Product Type: Medicare /    In time: 9:31 am      Out time: 10:50 am  Total Treatment Time (min): 79  Total Timed Codes (min): 69  1:1 Treatment Time (min): 30  Visit #: 5 of     Treatment Area: Acute postoperative pain of right shoulder [G89.18, M25.511]    SUBJECTIVE  Pain Level (0-10 scale): 2  Any medication changes, allergies to medications, adverse drug reactions, diagnosis change, or new procedure performed?: [x] No    [] Yes (see summary sheet for update)  Subjective functional status/changes:   [] No changes reported  \"Just sore in the delt. \"    OBJECTIVE  Modality rationale: decrease edema, decrease inflammation and decrease pain to improve the patients ability to improve self-care    Min Type Additional Details    [] Estim: []Att   []Unatt        []TENS instruct                  []IFC  []Premod   []NMES                     []Other:  []w/US   []w/ice   []w/heat  Position:  Location:    []  Traction: [] Cervical       []Lumbar                       [] Prone          []Supine                       []Intermittent   []Continuous Lbs:  [] before manual  [] after manual    []  Ultrasound: []Continuous   [] Pulsed                           []1MHz   []3MHz Location:  W/cm2:    []  Iontophoresis with dexamethasone         Location: [] Take home patch   [] In clinic    []  Ice     []  heat  []  Ice massage Position:  Location:   10 [x]  Vasopneumatic Device Pressure:       [] lo [x] med [] hi   Temperature: [x] lo [] med [] hi   [x] Skin assessment post-treatment:  [x]intact []redness- no adverse reaction       []redness  adverse reaction:     54 min Therapeutic Exercise:  [x] See flow sheet:    Rationale: increase ROM, increase strength and improve coordination to improve the patients ability to improve progress for return to PLOF 15 min Manual Therapy:  Grade 2-3 distraction and posterior glide, pect major and lat / teres release, PROM flexion, scaption, ER and IR    Rationale: decrease pain, increase ROM and increase tissue extensibility to improve reaching, self-care, mobility           X min Patient Education: [x] Review HEP     Other Objective/Functional Measures:    Patient now 2 months post-op. PROM flexion: ~165 deg     Fatigue noted with shoulder flexion/abd with YTB - expect normal soreness, but will cont to progress. Pain Level (0-10 scale) post treatment: 0    ASSESSMENT/Changes in Function:   Slowly improving GHJ mobility into all directions. Patient demo awareness of UT overcompensations with all elevation attempts and is quick to correct. Patient will continue to benefit from skilled PT services to modify and progress therapeutic interventions, address functional mobility deficits, address ROM deficits, address strength deficits, analyze and address soft tissue restrictions, analyze and cue movement patterns, analyze and modify body mechanics/ergonomics and assess and modify postural abnormalities to attain remaining goals. []  See Plan of Care  [x]  See progress note/recertification (79/0/99)  []  See Discharge Summary         Progress towards goals / Updated goals:  1.   Patient will demo PROM flexion to 145 deg for improved joint mobility. -Goal progressing; ~165 deg flexion PROM (12/21/17)   2. Pt will have AROM ER at 0 degrees abd to > or = 75 deg to progress dressing, functional ADLs. -Goal met; AROM at 0 deg ABD to 51 deg ER AROM (12/14/17)   3. Patient will demo 5/5 elbow and wrist strength for ease with bathing , dressing and feeding activities -Goal met at 5/5  (12/18/17)   4. Pt will have AROM R GHJ elevation to > or = 150 with normal mechanics to improve ADLs, dressing, self-care.  -Goal progressing; AAROM flexion/scaption to 158 deg (12/14/17)     PLAN  [x]  Upgrade activities as tolerated     [x] Continue plan of care  []  Update interventions per flow sheet       []  Discharge due to:_  []  Other:_    Sapphire Dia, KARY  12/21/2017

## 2017-12-27 ENCOUNTER — HOSPITAL ENCOUNTER (OUTPATIENT)
Dept: PHYSICAL THERAPY | Age: 62
End: 2017-12-27
Payer: COMMERCIAL

## 2018-01-02 ENCOUNTER — HOSPITAL ENCOUNTER (OUTPATIENT)
Dept: PHYSICAL THERAPY | Age: 63
Discharge: HOME | End: 2018-01-02
Payer: COMMERCIAL

## 2018-01-02 PROCEDURE — 97016 VASOPNEUMATIC DEVICE THERAPY: CPT

## 2018-01-02 PROCEDURE — 97140 MANUAL THERAPY 1/> REGIONS: CPT

## 2018-01-02 PROCEDURE — 97110 THERAPEUTIC EXERCISES: CPT

## 2018-01-02 NOTE — PROGRESS NOTES
PT DAILY TREATMENT NOTE     Patient Name: Nikko Agent  Date:2018  : 1955  [x]  Patient  Verified  Payor: [place] [employer] / Plan: [place] & B / Product Type: Medicare /    In time: 11:00     Out time: 12:10  Total Treatment Time (min): 70  Total Timed Codes (min): 70  1:1 Treatment Time (min): 60  Visit #: 6 of     Treatment Area: Acute postoperative pain of right shoulder [G89.18, M25.511]    SUBJECTIVE  Pain Level (0-10 scale): 3  Any medication changes, allergies to medications, adverse drug reactions, diagnosis change, or new procedure performed?: [x] No    [] Yes (see summary sheet for update)  Subjective functional status/changes:   [] No changes reported  \"I'm doing well overall; I slept funny and it's cold which is why it is a bit more sore today. \"    OBJECTIVE  Modality rationale: decrease edema, decrease inflammation and decrease pain to improve the patients ability to improve self-care    Min Type Additional Details    [] Estim: []Att   []Unatt        []TENS instruct                  []IFC  []Premod   []NMES                     []Other:  []w/US   []w/ice   []w/heat  Position:  Location:    []  Traction: [] Cervical       []Lumbar                       [] Prone          []Supine                       []Intermittent   []Continuous Lbs:  [] before manual  [] after manual    []  Ultrasound: []Continuous   [] Pulsed                           []1MHz   []3MHz Location:  W/cm2:    []  Iontophoresis with dexamethasone         Location: [] Take home patch   [] In clinic    []  Ice     []  heat  []  Ice massage Position:  Location:   10 [x]  Vasopneumatic Device Pressure:       [] lo [x] med [] hi   Temperature: [x] lo [] med [] hi   [x] Skin assessment post-treatment:  [x]intact []redness- no adverse reaction       []redness  adverse reaction:     48 min Therapeutic Exercise:  [x] See flow sheet:    Rationale: increase ROM, increase strength and improve coordination to improve the patients ability to improve progress for return to PLOF     12 min Manual Therapy:  Grade 2-3 distraction and posterior glide, PROM in all planes   Rationale: decrease pain, increase ROM and increase tissue extensibility to improve reaching, self-care, mobility           X min Patient Education: [x] Review HEP     Other Objective/Functional Measures: Added wall push ups. Pain Level (0-10 scale) post treatment: 0    ASSESSMENT/Changes in Function: Pt is 9.5 weeks post op and cont with UT compensation with elevation attempts > 90 degrees. Pain with active flex/abd @ ~ 90 deg and above. Limited reach behind back on R: to R buttock. No exacerbation of sx with addition of wall push ups. Patient will continue to benefit from skilled PT services to modify and progress therapeutic interventions, address functional mobility deficits, address ROM deficits, address strength deficits, analyze and address soft tissue restrictions, analyze and cue movement patterns, analyze and modify body mechanics/ergonomics and assess and modify postural abnormalities to attain remaining goals. []  See Plan of Care  [x]  See progress note/recertification (52/5/02)  []  See Discharge Summary         Progress towards goals / Updated goals:  1.   Patient will demo PROM flexion to 145 deg for improved joint mobility. -Goal progressing; ~165 deg flexion PROM (12/21/17)   2. Pt will have AROM ER at 0 degrees abd to > or = 75 deg to progress dressing, functional ADLs. -Goal met; AROM at 0 deg ABD to 51 deg ER AROM (12/14/17)   3. Patient will demo 5/5 elbow and wrist strength for ease with bathing , dressing and feeding activities -Goal met at 5/5  (12/18/17)   4. Pt will have AROM R GHJ elevation to > or = 150 with normal mechanics to improve ADLs, dressing, self-care.  -Goal progressing; AAROM flexion/scaption to 158 deg (12/14/17)     PLAN  [x]  Upgrade activities as tolerated     [x]  Continue plan of care  []  Update interventions per flow sheet       []  Discharge due to:_  [x]  Other:_PN needed NV.    Josefa Solorio V, PTA  1/2/2018

## 2018-01-05 ENCOUNTER — APPOINTMENT (OUTPATIENT)
Dept: PHYSICAL THERAPY | Age: 63
End: 2018-01-05
Payer: COMMERCIAL

## 2018-01-09 ENCOUNTER — HOSPITAL ENCOUNTER (OUTPATIENT)
Dept: PHYSICAL THERAPY | Age: 63
Discharge: HOME | End: 2018-01-09
Payer: COMMERCIAL

## 2018-01-09 PROCEDURE — 97016 VASOPNEUMATIC DEVICE THERAPY: CPT

## 2018-01-09 PROCEDURE — 97140 MANUAL THERAPY 1/> REGIONS: CPT

## 2018-01-09 PROCEDURE — G8985 CARRY GOAL STATUS: HCPCS

## 2018-01-09 PROCEDURE — G8984 CARRY CURRENT STATUS: HCPCS

## 2018-01-09 PROCEDURE — 97110 THERAPEUTIC EXERCISES: CPT

## 2018-01-09 NOTE — PROGRESS NOTES
PT DAILY TREATMENT NOTE     Patient Name: Dora Fitzpatrick  Date:2018  : 1955  [x]  Patient  Verified  Payor: [employer] / Plan: Piero Kruger / Product Type: [employer] /    In time: 11:03 am    Out time: 12:00 pm  Total Treatment Time (min): 57  Visit #: 7 of     Treatment Area: Acute postoperative pain of right shoulder [G89.18, M25.511]    SUBJECTIVE  Pain Level (0-10 scale): 2  Any medication changes, allergies to medications, adverse drug reactions, diagnosis change, or new procedure performed?: [x] No    [] Yes (see summary sheet for update)  Subjective functional status/changes:   [] No changes reported  \"I can reach better and am feeling a little better today, but still have that pain in the shoulder. \"    OBJECTIVE  Modality rationale: decrease edema, decrease inflammation and decrease pain to improve the patients ability to improve self-care    Min Type Additional Details    [] Estim: []Att   []Unatt        []TENS instruct                  []IFC  []Premod   []NMES                     []Other:  []w/US   []w/ice   []w/heat  Position:  Location:    []  Traction: [] Cervical       []Lumbar                       [] Prone          []Supine                       []Intermittent   []Continuous Lbs:  [] before manual  [] after manual    []  Ultrasound: []Continuous   [] Pulsed                           []1MHz   []3MHz Location:  W/cm2:    []  Iontophoresis with dexamethasone         Location: [] Take home patch   [] In clinic    []  Ice     []  heat  []  Ice massage Position:  Location:   10 [x]  Vasopneumatic Device Pressure:       [] lo [x] med [] hi   Temperature: [x] lo [] med [] hi   [x] Skin assessment post-treatment:  [x]intact []redness- no adverse reaction       []redness  adverse reaction:     24 min Therapeutic Exercise:  [x] See flow sheet: added wall V's   Rationale: increase ROM, increase strength and improve coordination to improve the patients ability to improve progress for return to PLOF     23 min Manual Therapy:  Reassessment; grade 2-3 distraction and posterior glide, PROM in all planes, lat release   Rationale: decrease pain, increase ROM and increase tissue extensibility to improve reaching, self-care, mobility           X min Patient Education: [x] Review HEP     Other Objective/Functional Measures:   PROM: flex 156 deg, scaption 145 deg, ER @ 90 deg ABD 70 deg  AROM: flex 152 deg, scaption 140 deg, ER @ 0 deg ABD 60 deg, FIR NT  Shoulder strength, within available ROM: flex 4+/5, abd 4/5, ER/IR 5/5  Elbow strength: 5/5  Wrist strength: 5/5  FOTO score: 70/100 (50/100 at last assessment)    Pain Level (0-10 scale) post treatment: 0    ASSESSMENT/Changes in Function:   See PN. Patient demos difficulty scapular protraction for proper SHR, but able to correct with cueing. Mild capsular tightness posteriorly and pt notes inc lat tightness since inc tolerance to ADLs. Patient will continue to benefit from skilled PT services to modify and progress therapeutic interventions, address functional mobility deficits, address ROM deficits, address strength deficits, analyze and address soft tissue restrictions, analyze and cue movement patterns, analyze and modify body mechanics/ergonomics and assess and modify postural abnormalities to attain remaining goals. []  See Plan of Care  [x]  See progress note/recertification (36/3/38)  []  See Discharge Summary         Progress towards goals / Updated goals:  1.   Patient will demo PROM flexion to 145 deg for improved joint mobility. -Goal met; 156 deg flexion PROM   2. Pt will have AROM ER at 0 degrees abd to > or = 75 deg to progress dressing, functional ADLs. -Goal progressing; AROM at 0 deg ABD to 60 deg ER AROM   3. Patient will demo 5/5 elbow and wrist strength for ease with bathing , dressing and feeding activities -Goal met at 5/5   4.   Pt will have AROM R GHJ elevation to > or = 150 with normal mechanics to improve ADLs, dressing, self-care.  -Goal progressing; AROM flexion 152 deg with mild compensation     PLAN  [x]  Upgrade activities as tolerated     [x]  Continue plan of care  []  Update interventions per flow sheet       []  Discharge due to:_  [x]  Other: see PN; cont for remaining scheduled sessions      Bay Alfonso, KARY  1/9/2018

## 2018-01-11 ENCOUNTER — HOSPITAL ENCOUNTER (OUTPATIENT)
Dept: PHYSICAL THERAPY | Age: 63
Discharge: HOME | End: 2018-01-11
Payer: COMMERCIAL

## 2018-01-11 PROCEDURE — 97016 VASOPNEUMATIC DEVICE THERAPY: CPT

## 2018-01-11 PROCEDURE — 97110 THERAPEUTIC EXERCISES: CPT

## 2018-01-11 PROCEDURE — 97140 MANUAL THERAPY 1/> REGIONS: CPT

## 2018-01-11 NOTE — PROGRESS NOTES
PT DAILY TREATMENT NOTE     Patient Name: Grace Hastings III  Date:2018  : 1955  [x]  Patient  Verified  Payor: Maico Kaur / Plan: Shamir Patel / Product Type: [employer] /    In time: 11:00 am    Out time: 12:00 pm  Total Treatment Time (min): 60  Visit #: 1 of     Treatment Area: Acute postoperative pain of right shoulder [G89.18, M25.511]    SUBJECTIVE  Pain Level (0-10 scale): 2 in mid-deltoid and biceps  Any medication changes, allergies to medications, adverse drug reactions, diagnosis change, or new procedure performed?: [x] No    [] Yes (see summary sheet for update)  Subjective functional status/changes:   [] No changes reported  \"I bought a lacrosse ball. It has helped a lot. \"    OBJECTIVE  Modality rationale: decrease edema, decrease inflammation and decrease pain to improve the patients ability to improve self-care    Min Type Additional Details    [] Estim: []Att   []Unatt        []TENS instruct                  []IFC  []Premod   []NMES                     []Other:  []w/US   []w/ice   []w/heat  Position:  Location:    []  Traction: [] Cervical       []Lumbar                       [] Prone          []Supine                       []Intermittent   []Continuous Lbs:  [] before manual  [] after manual    []  Ultrasound: []Continuous   [] Pulsed                           []1MHz   []3MHz Location:  W/cm2:    []  Iontophoresis with dexamethasone         Location: [] Take home patch   [] In clinic    []  Ice     []  heat  []  Ice massage Position:  Location:   10 [x]  Vasopneumatic Device Pressure:       [] lo [x] med [] hi   Temperature: [x] lo [] med [] hi   [x] Skin assessment post-treatment:  [x]intact []redness- no adverse reaction       []redness  adverse reaction:     35 min Therapeutic Exercise:  [x] See flow sheet: added supine scapular CCW/CW and ABCs   Rationale: increase ROM, increase strength and improve coordination to improve the patients ability to improve progress for return to PLOF     15 min Manual Therapy:  grade 2-3 distraction and posterior glide, PROM in all planes inc SL gentle extension, lat release   Rationale: decrease pain, increase ROM and increase tissue extensibility to improve reaching, self-care, mobility           X min Patient Education: [x] Review HEP     Other Objective/Functional Measures:     Pain Level (0-10 scale) post treatment: 0    ASSESSMENT/Changes in Function:   Improved SHR with wall V's with visible scap protraction. Patient challenged with lower scapular stability during supine CCW/CW, but slowly improving with practice. Mild capsular tightness posteriorly>inferiorly. Patient will continue to benefit from skilled PT services to modify and progress therapeutic interventions, address functional mobility deficits, address ROM deficits, address strength deficits, analyze and address soft tissue restrictions, analyze and cue movement patterns, analyze and modify body mechanics/ergonomics and assess and modify postural abnormalities to attain remaining goals. []  See Plan of Care  [x]  See progress note/recertification (35/5/88)  []  See Discharge Summary         Progress towards goals / Updated goals:  1. Pt will have AROM ER at 0 degrees abd to > or = 75 deg to progress dressing, functional ADLs. 2.  Pt will have AROM R GHJ elevation to > or = 160 deg with normal mechanics to improve ADLs, dressing, self-care. 3.  Pt will demo 4+/5 shoulder strength to improve stability for lifting.     PLAN  [x]  Upgrade activities as tolerated     [x]  Continue plan of care  []  Update interventions per flow sheet       []  Discharge due to:_  []  Other:_       Sebastien Bhatti, PTA  1/11/2018

## 2018-01-15 ENCOUNTER — HOSPITAL ENCOUNTER (OUTPATIENT)
Dept: PHYSICAL THERAPY | Age: 63
Discharge: HOME | End: 2018-01-15
Payer: COMMERCIAL

## 2018-01-15 PROCEDURE — 97140 MANUAL THERAPY 1/> REGIONS: CPT

## 2018-01-15 PROCEDURE — 97016 VASOPNEUMATIC DEVICE THERAPY: CPT

## 2018-01-15 PROCEDURE — 97110 THERAPEUTIC EXERCISES: CPT

## 2018-01-15 NOTE — PROGRESS NOTES
PT DAILY TREATMENT NOTE     Patient Name: Roselia Ramos III  Date:1/15/2018  : 1955  [x]  Patient  Verified  Payor: Evon Andres / Plan: Joya Nation / Product Type: [employer] /    In time: 10:25    Out time: 11:18  Total Treatment Time (min): 53  Visit #: 2 of     Treatment Area: Acute postoperative pain of right shoulder [G89.18, M25.511]    SUBJECTIVE  Pain Level (0-10 scale): 2 in mid-deltoid and biceps  Any medication changes, allergies to medications, adverse drug reactions, diagnosis change, or new procedure performed?: [x] No    [] Yes (see summary sheet for update)  Subjective functional status/changes:   [] No changes reported  Pt reported increased soreness on Saturday and took 1/2 pain pill. He reports vacuuming the other day with no adverse effect.     OBJECTIVE  Modality rationale: decrease edema, decrease inflammation and decrease pain to improve the patients ability to improve self-care    Min Type Additional Details    [] Estim: []Att   []Unatt        []TENS instruct                  []IFC  []Premod   []NMES                     []Other:  []w/US   []w/ice   []w/heat  Position:  Location:    []  Traction: [] Cervical       []Lumbar                       [] Prone          []Supine                       []Intermittent   []Continuous Lbs:  [] before manual  [] after manual    []  Ultrasound: []Continuous   [] Pulsed                           []1MHz   []3MHz Location:  W/cm2:    []  Iontophoresis with dexamethasone         Location: [] Take home patch   [] In clinic    []  Ice     []  heat  []  Ice massage Position:  Location:   10 [x]  Vasopneumatic Device Pressure:       [] lo [x] med [] hi   Temperature: [x] lo [] med [] hi   [x] Skin assessment post-treatment:  [x]intact []redness- no adverse reaction       []redness  adverse reaction:     33 min Therapeutic Exercise:  [x] See flow sheet: added supine scapular CCW/CW and ABCs   Rationale: increase ROM, increase strength and improve coordination to improve the patients ability to improve progress for return to PLOF     10 min Manual Therapy:  grade 2-3 distraction and posterior glide, PROM in all planes inc SL gentle extension, lat release   Rationale: decrease pain, increase ROM and increase tissue extensibility to improve reaching, self-care, mobility           X min Patient Education: [x] Review HEP     Other Objective/Functional Measures:   AROM R shoulder in seated: Flex= 155, ER= 65 at 0 degrees abduction, Ext= 55. Passive R shoulder abd in supine= 95 degrees with discomfort at end range. Strength 4+/5 for flexion, abd and ER. Pain Level (0-10 scale) post treatment: 1/10    ASSESSMENT/Changes in Function:   Ms fatigue with push ups and finger ladder with lift off. Improved control of LT with wall V's. Inferior GHJ mobs reduces discomfort during passive abduction, but not much motion gained. Patient will continue to benefit from skilled PT services to modify and progress therapeutic interventions, address functional mobility deficits, address ROM deficits, address strength deficits, analyze and address soft tissue restrictions, analyze and cue movement patterns, analyze and modify body mechanics/ergonomics and assess and modify postural abnormalities to attain remaining goals. []  See Plan of Care  [x]  See progress note/recertification (23/8/92)  []  See Discharge Summary         Progress towards goals / Updated goals:  1. Pt will have AROM ER at 0 degrees abd to > or = 75 deg to progress dressing, functional ADLs.- Goal progressing, active ER to 65 degrees at 0 degrees abd. 1/15/18. 2.  Pt will have AROM R GHJ elevation to > or = 160 deg with normal mechanics to improve ADLs, dressing, self-care. - Goal progressing, active flexion to 155 degrees. 1/15/18. 3.  Pt will demo 4+/5 shoulder strength to improve stability for lifting.- Goal progressing.  4+/5 for flexion, abduction and ER as compared to opposite UE.    PLAN  [x]  Upgrade activities as tolerated     [x]  Continue plan of care  []  Update interventions per flow sheet       []  Discharge due to:_  []  Other:_       Vicente Mejia  1/15/2018

## 2018-01-17 ENCOUNTER — HOSPITAL ENCOUNTER (OUTPATIENT)
Dept: PHYSICAL THERAPY | Age: 63
Discharge: HOME | End: 2018-01-17
Payer: COMMERCIAL

## 2018-01-17 PROCEDURE — 97140 MANUAL THERAPY 1/> REGIONS: CPT

## 2018-01-17 PROCEDURE — 97110 THERAPEUTIC EXERCISES: CPT

## 2018-01-17 PROCEDURE — 97016 VASOPNEUMATIC DEVICE THERAPY: CPT

## 2018-01-17 NOTE — PROGRESS NOTES
PT DAILY TREATMENT NOTE     Patient Name: Rinku English III  Date:2018  : 1955  [x]  Patient  Verified  Payor: Shannan Thomas / Plan: [place] MED[place] PART A [employer] B / Product Type: Medicare /    In time: 10:07 am      Out time: 11:16 am  Total Treatment Time (min): 69  Visit #: 3 of     Treatment Area: Acute postoperative pain of right shoulder [G89.18, M25.511]    SUBJECTIVE  Pain Level (0-10 scale): 1  Any medication changes, allergies to medications, adverse drug reactions, diagnosis change, or new procedure performed?: [x] No    [] Yes (see summary sheet for update)  Subjective functional status/changes:   [] No changes reported  \"I slept well last night. \"    OBJECTIVE  Modality rationale: decrease edema, decrease inflammation and decrease pain to improve the patients ability to improve self-care    Min Type Additional Details    [] Estim: []Att   []Unatt        []TENS instruct                  []IFC  []Premod   []NMES                     []Other:  []w/US   []w/ice   []w/heat  Position:  Location:    []  Traction: [] Cervical       []Lumbar                       [] Prone          []Supine                       []Intermittent   []Continuous Lbs:  [] before manual  [] after manual    []  Ultrasound: []Continuous   [] Pulsed                           []1MHz   []3MHz Location:  W/cm2:    []  Iontophoresis with dexamethasone         Location: [] Take home patch   [] In clinic    []  Ice     []  heat  []  Ice massage Position:  Location:   10 [x]  Vasopneumatic Device Pressure:       [] lo [x] med [] hi   Temperature: [x] lo [] med [] hi   [x] Skin assessment post-treatment:  [x]intact []redness- no adverse reaction       []redness  adverse reaction:     49 min Therapeutic Exercise:  [x] See flow sheet: added 1/2 prone Ms, Ts, and Ys with emphasis on pain-free scapular control   Rationale: increase ROM, increase strength and improve coordination to improve the patients ability to improve progress for return to PLOF     10 min Manual Therapy:  grade 2-3 distraction and posterior glide, scapular clocks, PROM in all planes inc SL gentle extension, lat release   Rationale: decrease pain, increase ROM and increase tissue extensibility to improve reaching, self-care, mobility           X min Patient Education: [x] Review HEP - add 1/2 prone Ms, Ts, and Ys, reviews stretching, lat releases as needed, icing     Other Objective/Functional Measures:    Improvements: sleep quality 5 hours   VCs to reduce lat substitutions with prone M's.    Pain Level (0-10 scale) post treatment: 0    ASSESSMENT/Changes in Function:   Patient notes improved sleep quality since last session and reduced pain levels. Quick onset of fatigue with prone series and pt demos difficulty isolating the trapezius versus the lat but able to improve with cueing. Patient will need further LT/SA strengthening to improve stability for sport. Patient will continue to benefit from skilled PT services to modify and progress therapeutic interventions, address functional mobility deficits, address ROM deficits, address strength deficits, analyze and address soft tissue restrictions, analyze and cue movement patterns, analyze and modify body mechanics/ergonomics and assess and modify postural abnormalities to attain remaining goals. []  See Plan of Care  [x]  See progress note/recertification (58/8/08)  []  See Discharge Summary         Progress towards goals / Updated goals:  1. Pt will have AROM ER at 0 degrees abd to > or = 75 deg to progress dressing, functional ADLs.- Goal progressing, active ER to 65 degrees at 0 degrees abd. 1/15/18. 2.  Pt will have AROM R GHJ elevation to > or = 160 deg with normal mechanics to improve ADLs, dressing, self-care. - Goal progressing, active flexion to 155 degrees. 1/15/18. 3.  Pt will demo 4+/5 shoulder strength to improve stability for lifting.- Goal progressing.  4+/5 for flexion, abduction and ER as compared to opposite UE.     PLAN  [x]  Upgrade activities as tolerated     [x]  Continue plan of care  []  Update interventions per flow sheet       []  Discharge due to:_  [x]  Other: pt to f/u with referring MD today, assess changes to tx upon [address], PTA  1/17/2018

## 2018-01-22 ENCOUNTER — HOSPITAL ENCOUNTER (OUTPATIENT)
Dept: PHYSICAL THERAPY | Age: 63
Discharge: HOME | End: 2018-01-22
Payer: COMMERCIAL

## 2018-01-22 PROCEDURE — 97110 THERAPEUTIC EXERCISES: CPT

## 2018-01-22 PROCEDURE — 97016 VASOPNEUMATIC DEVICE THERAPY: CPT

## 2018-01-22 PROCEDURE — 97140 MANUAL THERAPY 1/> REGIONS: CPT

## 2018-01-22 NOTE — PROGRESS NOTES
PT DAILY TREATMENT NOTE     Patient Name: Shantell Goldstein  Date:2018  : 1955  [x]  Patient  Verified  Payor: [place] MED[place] / Plan: [place] MED[place]ARE [place] A & B / Product Type: Medicare /    In time:10:27  Out time:11:32  Total Treatment Time (min): 65  Total Timed Codes (min): 55  1:1 Treatment Time (min): 55   Visit #: 4 of     Treatment Area: Acute postoperative pain of right shoulder [G89.18, M25.511]    SUBJECTIVE  Pain Level (0-10 scale): 1  Any medication changes, allergies to medications, adverse drug reactions, diagnosis change, or new procedure performed?: [x] No    [] Yes (see summary sheet for update)  Subjective functional status/changes:   [] No changes reported  Improvements: some reaching   Deficits: reaching with weights in the hand, FIR, buckling seat belt (pressure/strength), writing (hand shaking)     OBJECTIVE  Modality rationale: decrease edema, decrease inflammation and decrease pain to improve the patients ability to improve reaching, self-care    Min Type Additional Details    [] Estim: []Att   []Unatt        []TENS instruct                  []IFC  []Premod   []NMES                     []Other:  []w/US   []w/ice   []w/heat  Position:  Location:    []  Traction: [] Cervical       []Lumbar                       [] Prone          []Supine                       []Intermittent   []Continuous Lbs:  [] before manual  [] after manual    []  Ultrasound: []Continuous   [] Pulsed                           []1MHz   []3MHz Location:  W/cm2:    []  Iontophoresis with dexamethasone         Location: [] Take home patch   [] In clinic    []  Ice     []  heat  []  Ice massage Position:  Location:   10 [x]  Vasopneumatic Device Pressure:       [] lo [x] med [] hi   Temperature: [x] lo [] med [] hi   [x] Skin assessment post-treatment:  [x]intact []redness- no adverse reaction       []redness  adverse reaction:       45 min Therapeutic Exercise:  [x] See flow sheet :   Rationale: increase ROM, increase strength and improve coordination to improve the patients ability to improve reaching, self-care, behind back ADLs     10 min Manual Therapy:  Grade II to III posterior and inferior GHJ mobs. PROm all planes. TrP release to the R latissimus insertion   Rationale: decrease pain, increase ROM, increase tissue extensibility and decrease trigger points to improve reaching, mobility         x min Patient Education: [x] Review HEP    [] Progressed/Changed HEP based on:   [] positioning   [] body mechanics   [] transfers   [] heat/ice application        Other Objective/Functional Measures:     Pain Level (0-10 scale) post treatment: 0    ASSESSMENT/Changes in Function: pt has minimal painful arc with ROM. End range tightness and min c/o pain limits full PROM but progressing well. Good tolerance to strength    Patient will continue to benefit from skilled PT services to modify and progress therapeutic interventions, address functional mobility deficits, address ROM deficits, address strength deficits, analyze and address soft tissue restrictions, analyze and cue movement patterns, analyze and modify body mechanics/ergonomics, assess and modify postural abnormalities and instruct in home and community integration to attain remaining goals. []  See Plan of Care  []  See progress note/recertification  []  See Discharge Summary         Progress towards goals / Updated goals:  1.  Pt will have AROM ER at 0 degrees abd to > or = 75 deg to progress dressing, functional ADLs.- Goal progressing, active ER to 65 degrees at 0 degrees abd. 1/22/18. 2.  Pt will have AROM R GHJ elevation to > or = 160 deg with normal mechanics to improve ADLs, dressing, self-care. - Goal progressing, active flexion to 155 degrees. 1/15/18; improving mechanics (1/22/18)  3.  Pt will demo 4+/5 shoulder strength to improve stability for lifting.- Goal progressing. 4+/5 for flexion, abduction and ER as compared to opposite UE.     PLAN  [x] Upgrade activities as tolerated     [x]  Continue plan of care  []  Update interventions per flow sheet       []  Discharge due to:_  []  Other:_      Sumanth Martin PT 1/22/2018  7:43 AM

## 2018-01-25 ENCOUNTER — HOSPITAL ENCOUNTER (OUTPATIENT)
Dept: PHYSICAL THERAPY | Age: 63
Discharge: HOME | End: 2018-01-25
Payer: COMMERCIAL

## 2018-01-25 PROCEDURE — 97016 VASOPNEUMATIC DEVICE THERAPY: CPT

## 2018-01-25 PROCEDURE — 97110 THERAPEUTIC EXERCISES: CPT

## 2018-01-25 PROCEDURE — 97140 MANUAL THERAPY 1/> REGIONS: CPT

## 2018-01-25 NOTE — PROGRESS NOTES
PT DAILY TREATMENT NOTE     Patient Name: Christel Salgado  Date:2018  : 1955  [x]  Patient  Verified  Payor: [place] MEDICARE / Plan: [place] MEDICARE PART A & B / Product Type: Medicare /    In time: 10:38 am            Out time: 11:25 am  Total Treatment Time (min): 47  Total Timed Codes (min): 37  1:1 Treatment Time (min): 30  Visit #: 5 of     Treatment Area: Acute postoperative pain of right shoulder [G89.18, M25.511]    SUBJECTIVE  Pain Level (0-10 scale): 3  Any medication changes, allergies to medications, adverse drug reactions, diagnosis change, or new procedure performed?: [x] No    [] Yes (see summary sheet for update)  Subjective functional status/changes:   [] No changes reported  \"I am more sore than before. I did one arm pushups off a pole. \"    OBJECTIVE  Modality rationale: decrease edema, decrease inflammation and decrease pain to improve the patients ability to improve reaching, self-care    Min Type Additional Details    [] Estim: []Att   []Unatt        []TENS instruct                  []IFC  []Premod   []NMES                     []Other:  []w/US   []w/ice   []w/heat  Position:  Location:    []  Traction: [] Cervical       []Lumbar                       [] Prone          []Supine                       []Intermittent   []Continuous Lbs:  [] before manual  [] after manual    []  Ultrasound: []Continuous   [] Pulsed                           []1MHz   []3MHz Location:  W/cm2:    []  Iontophoresis with dexamethasone         Location: [] Take home patch   [] In clinic    []  Ice     []  heat  []  Ice massage Position:  Location:   10 [x]  Vasopneumatic Device Pressure:       [] lo [x] med [] hi   Temperature: [x] lo [] med [] hi   [x] Skin assessment post-treatment:  [x]intact []redness- no adverse reaction       []redness  adverse reaction:     21 min Therapeutic Exercise:  [x] See flow sheet:    Rationale: increase ROM, increase strength and improve coordination to improve the patients ability to improve reaching, self-care, behind back ADLs     16 min Manual Therapy:  grade II to III posterior and inferior GHJ mobs; PROM all planes. TrP release to the R latissimus insertion; PNF D1/D2 PROM, AAROM, AROM in supine   Rationale: decrease pain, increase ROM, increase tissue extensibility and decrease trigger points to improve reaching, mobility         X min Patient Education: [x] Review HEP - add PNF x 4 in supine     Other Objective/Functional Measures:     Pain Level (0-10 scale) post treatment: 0    ASSESSMENT/Changes in Function:   Patient progressing appropriately in PT. Min (+) painful arc with all elevation attempts, therefore, worked on scap control for SHR with pt able to demo D2 flexion ~ 75% without pain. Patient will continue to benefit from skilled PT services to modify and progress therapeutic interventions, address functional mobility deficits, address ROM deficits, address strength deficits, analyze and address soft tissue restrictions, analyze and cue movement patterns, analyze and modify body mechanics/ergonomics, assess and modify postural abnormalities and instruct in home and community integration to attain remaining goals. []  See Plan of Care  [x]  See progress note/recertification  []  See Discharge Summary          Progress towards goals / Updated goals:  1.  Pt will have AROM ER at 0 degrees abd to > or = 75 deg to progress dressing, functional ADLs.- Goal progressing, active ER to 65 degrees at 0 degrees abd. 1/22/18. 2.  Pt will have AROM R GHJ elevation to > or = 160 deg with normal mechanics to improve ADLs, dressing, self-care. - Goal progressing, active flexion to 155 degrees. 1/15/18; improving mechanics (1/22/18)  3.  Pt will demo 4+/5 shoulder strength to improve stability for lifting.- Goal progressing. 4+/5 for flexion, abduction and ER as compared to opposite UE.     PLAN  [x]  Upgrade activities as tolerated     [x]  Continue plan of care  [] Update interventions per flow sheet       []  Discharge due to:_  [x]  Other: assess goals NV for possible D/C to [id number] Clay, PTA  1/25/2018

## 2018-01-30 ENCOUNTER — HOSPITAL ENCOUNTER (OUTPATIENT)
Dept: PHYSICAL THERAPY | Age: 63
Discharge: HOME | End: 2018-01-30
Payer: COMMERCIAL

## 2018-01-30 PROCEDURE — 97140 MANUAL THERAPY 1/> REGIONS: CPT

## 2018-01-30 PROCEDURE — G8986 CARRY D/C STATUS: HCPCS

## 2018-01-30 PROCEDURE — 97110 THERAPEUTIC EXERCISES: CPT

## 2018-01-30 PROCEDURE — G8985 CARRY GOAL STATUS: HCPCS

## 2018-01-30 PROCEDURE — 97016 VASOPNEUMATIC DEVICE THERAPY: CPT

## 2018-01-30 NOTE — PROGRESS NOTES
[facility] AT [address] [place], [place]     Phone: [phone] Fax: [phone] SUMMARY  Patient Name: Symone Hawk III : 1955   Treatment/Medical Diagnosis: Acute postoperative pain of right shoulder [G89.18, M25.511]   Referral Source: Danny Kidney*     Date of Initial Visit: 17 Attended Visits: 19 Missed Visits: 0     SUMMARY OF TREATMENT  Pt is a 60y.o. year old male who presents with sp R TSR due to wear and tear from active lifestyle; DOS 10/26/17. Ther ex including strengthening, ROM, flexibility, stabilization; manual therapy including: joint mobs for pain control/capsular extensibility, PROM; Patient education; HEP, postural education, vaso for pain and edema.     CURRENT STATUS  Patient has made excellent progress in PT. Assessment as follows:  Improvements: overall reach, ease with ADLs, FIR for putting on belt, perceived strength and stability with lifting  FOTO score: 74/100 (at last assessment, 70/100)  (R) shoulder AROM: flex 165 deg,  abd 165 deg, [email or web address] 70 deg, IR/ADD to level of T11  (R) shoulder strength: flex 4+/5, ext 5/5, abd 4+/5, IR 4+/5, ER 5/5  Pain: (A) 0-3, (B) 0, (W) 3-4     Goal/Measure of Progress:  1.  Pt will have AROM ER at 0 degrees abd to > or = 75 deg to progress dressing, functional ADLs.- Goal progressing, active ER to 70 degrees (60 deg at last assessment)  2.  Pt will have AROM R GHJ elevation to > or = 160 deg with normal mechanics to improve ADLs, dressing, self-care. -Goal met at 165 deg active elevation (140 deg in scaption at last assessment)  3.  Pt will demo 4+/5 shoulder strength to improve stability for lifting. -Goal met; 4+/5 to 5/5 shoulder strength    Mobility:   Goal  CJ= 20-39%  D/C  CJ= 20-39%. The severity rating is based on the FOTO Score    RECOMMENDATIONS  Discontinue therapy. Progressing towards or have reached established goals.     If you have any questions/comments please contact us directly at [phone]. Thank you for allowing us to assist in the care of your patient.     LPTA Signature: Avi Renee Date: 1/30/18   Therapist Signature: Yessy Barragan DPT  Time: 4:57 PM   Reporting Period:  11/14/17-1/30/18

## 2018-01-30 NOTE — PROGRESS NOTES
PT DAILY TREATMENT NOTE     Patient Name: Rene Gibson  Date:2018  : 1955  [x]  Patient  Verified  Payor: [place] MEDICARE / Plan: [place] MEDICARE PART A & B / Product Type: Medicare /    In time: 7:30 am            Out time: 8:37 am  Total Treatment Time (min): 67  Total Timed Codes (min): 57  1:1 Treatment Time (min): 40  Visit #: 6 of     Treatment Area: Acute postoperative pain of right shoulder [G89.18, M25.511]    SUBJECTIVE  Pain Level (0-10 scale): 3  Any medication changes, allergies to medications, adverse drug reactions, diagnosis change, or new procedure performed?: [x] No    [] Yes (see summary sheet for update)  Subjective functional status/changes:   [] No changes reported  \"I am more sore than before. I did one arm pushups off a pole. \"    OBJECTIVE  Modality rationale: decrease edema, decrease inflammation and decrease pain to improve the patients ability to improve reaching, self-care    Min Type Additional Details    [] Estim: []Att   []Unatt        []TENS instruct                  []IFC  []Premod   []NMES                     []Other:  []w/US   []w/ice   []w/heat  Position:  Location:    []  Traction: [] Cervical       []Lumbar                       [] Prone          []Supine                       []Intermittent   []Continuous Lbs:  [] before manual  [] after manual    []  Ultrasound: []Continuous   [] Pulsed                           []1MHz   []3MHz Location:  W/cm2:    []  Iontophoresis with dexamethasone         Location: [] Take home patch   [] In clinic    []  Ice     []  heat  []  Ice massage Position:  Location:   10 [x]  Vasopneumatic Device Pressure:       [] lo [x] med [] hi   Temperature: [x] lo [] med [] hi   [x] Skin assessment post-treatment:  [x]intact []redness- no adverse reaction       []redness  adverse reaction:     41 min Therapeutic Exercise:  [x] See flow sheet: IR strengthening per MD protocol   Rationale: increase ROM, increase strength and improve coordination to improve the patients ability to improve reaching, self-care, behind back ADLs     16 min Manual Therapy:  Reassess; grade II to III posterior and inferior GHJ mobs; PROM all planes. TrP release to the R latissimus insertion; PNF D1/D2 PROM, AAROM, AROM in supine   Rationale: decrease pain, increase ROM, increase tissue extensibility and decrease trigger points to improve reaching, mobility         X min Patient Education: [x] Review HEP     Other Objective/Functional Measures:   Improvements: overall reach, ease with ADLs, FIR for putting on belt, perceived strength and stability with lifting  FOTO score: 74/100 (at last assessment, 70/100)  (R) shoulder AROM: flex 165 deg,  abd 165 deg, [email or web address] 70 deg, IR/ADD to level of T11  (R) shoulder strength: flex 4+/5, ext 5/5, abd 4+/5, IR 4+/5, ER 5/5  Pain: (A) 0-3, (B) 0, (W) 3-4     Pain Level (0-10 scale) post treatment: 0    ASSESSMENT/Changes in Function:   See D/C. Patient will continue to benefit from skilled PT services to modify and progress therapeutic interventions, address functional mobility deficits, address ROM deficits, address strength deficits, analyze and address soft tissue restrictions, analyze and cue movement patterns, analyze and modify body mechanics/ergonomics, assess and modify postural abnormalities and instruct in home and community integration to attain remaining goals. [x]  See Discharge Summary          Progress towards goals / Updated goals:  1.  Pt will have AROM ER at 0 degrees abd to > or = 75 deg to progress dressing, functional ADLs.- Goal progressing, active ER to 70 degrees (60 deg at last assessment)  2.  Pt will have AROM R GHJ elevation to > or = 160 deg with normal mechanics to improve ADLs, dressing, self-care.  -Goal met at 165 deg active elevation (140 deg in scaption at last assessment)  3.  Pt will demo 4+/5 shoulder strength to improve stability for lifting. -Goal met; 4+/5 to 5/5 shoulder strength    PLAN    [x]  Discharge due to: patient meeting goals    Abhishek Carter, PTA  1/30/2018

## 2018-09-18 ENCOUNTER — HOSPITAL ENCOUNTER (OUTPATIENT)
Dept: GENERAL RADIOLOGY | Age: 63
Discharge: HOME | End: 2018-09-18
Payer: COMMERCIAL

## 2018-09-18 DIAGNOSIS — M15.9 PRIMARY OSTEOARTHRITIS INVOLVING MULTIPLE JOINTS: ICD-10-CM

## 2018-09-18 PROCEDURE — 73130 X-RAY EXAM OF HAND: CPT

## 2018-09-18 PROCEDURE — 73600 X-RAY EXAM OF ANKLE: CPT

## 2018-09-18 PROCEDURE — 73521 X-RAY EXAM HIPS BI 2 VIEWS: CPT

## 2019-05-10 ENCOUNTER — HOSPITAL ENCOUNTER (OUTPATIENT)
Dept: GENERAL RADIOLOGY | Age: 64
Discharge: HOME | End: 2019-05-10
Payer: COMMERCIAL

## 2019-05-10 DIAGNOSIS — R07.89 OTHER CHEST PAIN: ICD-10-CM

## 2019-05-10 PROCEDURE — 71100 X-RAY EXAM RIBS UNI 2 VIEWS: CPT

## 2020-03-04 ENCOUNTER — HOSPITAL ENCOUNTER (OUTPATIENT)
Dept: NON INVASIVE DIAGNOSTICS | Age: 65
Discharge: HOME | End: 2020-03-04
Attending: FAMILY MEDICINE
Payer: COMMERCIAL

## 2020-03-04 DIAGNOSIS — R00.2 PALPITATIONS: ICD-10-CM

## 2020-03-04 PROCEDURE — 93226 XTRNL ECG REC<48 HR SCAN A/R: CPT

## 2020-07-22 ENCOUNTER — HOSPITAL ENCOUNTER (OUTPATIENT)
Dept: LAB | Age: 65
Discharge: HOME | End: 2020-07-22
Payer: COMMERCIAL

## 2020-07-22 LAB
ANION GAP SERPL CALC-SCNC: 8 MMOL/L (ref 3–18)
BUN SERPL-MCNC: 27 MG/DL (ref 7–18)
BUN/CREAT SERPL: 27 (ref 12–20)
CALCIUM SERPL-MCNC: 8.8 MG/DL (ref 8.5–10.1)
CHLORIDE SERPL-SCNC: 107 MMOL/L (ref 100–111)
CO2 SERPL-SCNC: 26 MMOL/L (ref 21–32)
CREAT SERPL-MCNC: 1.01 MG/DL (ref 0.6–1.3)
GLUCOSE SERPL-MCNC: 82 MG/DL (ref 74–99)
MAGNESIUM SERPL-MCNC: 2.3 MG/DL (ref 1.6–2.6)
POTASSIUM SERPL-SCNC: 4.1 MMOL/L (ref 3.5–5.5)
SODIUM SERPL-SCNC: 141 MMOL/L (ref 136–145)

## 2020-07-22 PROCEDURE — 83735 ASSAY OF MAGNESIUM: CPT

## 2020-07-22 PROCEDURE — 36415 COLL VENOUS BLD VENIPUNCTURE: CPT

## 2020-07-22 PROCEDURE — 80048 BASIC METABOLIC PNL TOTAL CA: CPT

## 2020-08-03 LAB
CHOLEST SERPL-MCNC: 176 MG/DL
GLUCOSE SERPL-MCNC: 93 MG/DL (ref 74–99)
HDLC SERPL-MCNC: 63 MG/DL (ref 40–60)
LDLC SERPL CALC-MCNC: 100.6 MG/DL (ref 0–100)
TRIGL SERPL-MCNC: 62 MG/DL (ref ?–150)